# Patient Record
Sex: FEMALE | Race: WHITE | Employment: OTHER | URBAN - METROPOLITAN AREA
[De-identification: names, ages, dates, MRNs, and addresses within clinical notes are randomized per-mention and may not be internally consistent; named-entity substitution may affect disease eponyms.]

---

## 2020-11-09 ENCOUNTER — APPOINTMENT (OUTPATIENT)
Dept: GENERAL RADIOLOGY | Age: 75
End: 2020-11-09
Attending: ORTHOPAEDIC SURGERY
Payer: COMMERCIAL

## 2020-11-09 ENCOUNTER — HOSPITAL ENCOUNTER (OUTPATIENT)
Age: 75
Setting detail: OUTPATIENT SURGERY
Discharge: HOME | End: 2020-11-09
Attending: ORTHOPAEDIC SURGERY | Admitting: ORTHOPAEDIC SURGERY
Payer: COMMERCIAL

## 2020-11-09 ENCOUNTER — ANESTHESIA (OUTPATIENT)
Dept: SURGERY | Age: 75
End: 2020-11-09
Payer: COMMERCIAL

## 2020-11-09 ENCOUNTER — ANESTHESIA EVENT (OUTPATIENT)
Dept: SURGERY | Age: 75
End: 2020-11-09
Payer: COMMERCIAL

## 2020-11-09 VITALS
TEMPERATURE: 97.6 F | DIASTOLIC BLOOD PRESSURE: 70 MMHG | HEART RATE: 67 BPM | RESPIRATION RATE: 16 BRPM | WEIGHT: 170 LBS | SYSTOLIC BLOOD PRESSURE: 141 MMHG | BODY MASS INDEX: 26.63 KG/M2 | OXYGEN SATURATION: 96 %

## 2020-11-09 LAB — POTASSIUM BLD-SCNC: 4 MMOL/L (ref 3.5–5.1)

## 2020-11-09 PROCEDURE — 74011000250 HC RX REV CODE- 250: Performed by: NURSE ANESTHETIST, CERTIFIED REGISTERED

## 2020-11-09 PROCEDURE — 76060000033 HC ANESTHESIA 1 TO 1.5 HR: Performed by: ORTHOPAEDIC SURGERY

## 2020-11-09 PROCEDURE — 76210000006 HC OR PH I REC 0.5 TO 1 HR: Performed by: ORTHOPAEDIC SURGERY

## 2020-11-09 PROCEDURE — C1713 ANCHOR/SCREW BN/BN,TIS/BN: HCPCS | Performed by: ORTHOPAEDIC SURGERY

## 2020-11-09 PROCEDURE — 73610 X-RAY EXAM OF ANKLE: CPT

## 2020-11-09 PROCEDURE — 74011250636 HC RX REV CODE- 250/636: Performed by: NURSE ANESTHETIST, CERTIFIED REGISTERED

## 2020-11-09 PROCEDURE — 77030000032 HC CUF TRNQT ZIMM -B: Performed by: ORTHOPAEDIC SURGERY

## 2020-11-09 PROCEDURE — 76010010054 HC POST OP PAIN BLOCK: Performed by: ORTHOPAEDIC SURGERY

## 2020-11-09 PROCEDURE — 74011250636 HC RX REV CODE- 250/636: Performed by: ANESTHESIOLOGY

## 2020-11-09 PROCEDURE — 77030020782 HC GWN BAIR PAWS FLX 3M -B: Performed by: ANESTHESIOLOGY

## 2020-11-09 PROCEDURE — 74011250637 HC RX REV CODE- 250/637: Performed by: ANESTHESIOLOGY

## 2020-11-09 PROCEDURE — 2709999900 HC NON-CHARGEABLE SUPPLY: Performed by: ORTHOPAEDIC SURGERY

## 2020-11-09 PROCEDURE — 77030019908 HC STETH ESOPH SIMS -A: Performed by: ANESTHESIOLOGY

## 2020-11-09 PROCEDURE — 77030003602 HC NDL NRV BLK BBMI -B: Performed by: ANESTHESIOLOGY

## 2020-11-09 PROCEDURE — 77030040922 HC BLNKT HYPOTHRM STRY -A: Performed by: ANESTHESIOLOGY

## 2020-11-09 PROCEDURE — 74011250636 HC RX REV CODE- 250/636: Performed by: ORTHOPAEDIC SURGERY

## 2020-11-09 PROCEDURE — 74011000250 HC RX REV CODE- 250: Performed by: ANESTHESIOLOGY

## 2020-11-09 PROCEDURE — 76942 ECHO GUIDE FOR BIOPSY: CPT | Performed by: ORTHOPAEDIC SURGERY

## 2020-11-09 PROCEDURE — 77030029732 HC BIT DRL ORTHOLOC 3DI WRGH -B: Performed by: ORTHOPAEDIC SURGERY

## 2020-11-09 PROCEDURE — 77030002933 HC SUT MCRYL J&J -A: Performed by: ORTHOPAEDIC SURGERY

## 2020-11-09 PROCEDURE — 77030021122 HC SPLNT MAT FST BSNM -A: Performed by: ORTHOPAEDIC SURGERY

## 2020-11-09 PROCEDURE — 74011000250 HC RX REV CODE- 250: Performed by: ORTHOPAEDIC SURGERY

## 2020-11-09 PROCEDURE — 76010000161 HC OR TIME 1 TO 1.5 HR INTENSV-TIER 1: Performed by: ORTHOPAEDIC SURGERY

## 2020-11-09 PROCEDURE — 76210000021 HC REC RM PH II 0.5 TO 1 HR: Performed by: ORTHOPAEDIC SURGERY

## 2020-11-09 PROCEDURE — 84132 ASSAY OF SERUM POTASSIUM: CPT

## 2020-11-09 PROCEDURE — 77030029902 HC PIN TEMP FIX LG WRGH -B: Performed by: ORTHOPAEDIC SURGERY

## 2020-11-09 DEVICE — IMPLANTABLE DEVICE
Type: IMPLANTABLE DEVICE | Site: ANKLE | Status: FUNCTIONAL
Brand: ORTHOLOC 3DI

## 2020-11-09 DEVICE — IMPLANTABLE DEVICE
Type: IMPLANTABLE DEVICE | Site: ANKLE | Status: FUNCTIONAL
Brand: ORTHOLOC

## 2020-11-09 DEVICE — IMPLANTABLE DEVICE
Type: IMPLANTABLE DEVICE | Site: ANKLE | Status: FUNCTIONAL
Brand: ORTHOLOC 3DI PLATING SYSTEM

## 2020-11-09 RX ORDER — LIDOCAINE HYDROCHLORIDE 10 MG/ML
0.1 INJECTION INFILTRATION; PERINEURAL AS NEEDED
Status: DISCONTINUED | OUTPATIENT
Start: 2020-11-09 | End: 2020-11-09 | Stop reason: HOSPADM

## 2020-11-09 RX ORDER — ONDANSETRON 2 MG/ML
4 INJECTION INTRAMUSCULAR; INTRAVENOUS
Status: DISCONTINUED | OUTPATIENT
Start: 2020-11-09 | End: 2020-11-09 | Stop reason: HOSPADM

## 2020-11-09 RX ORDER — ACETAMINOPHEN 500 MG
1000 TABLET ORAL ONCE
Status: COMPLETED | OUTPATIENT
Start: 2020-11-09 | End: 2020-11-09

## 2020-11-09 RX ORDER — SODIUM CHLORIDE, SODIUM LACTATE, POTASSIUM CHLORIDE, CALCIUM CHLORIDE 600; 310; 30; 20 MG/100ML; MG/100ML; MG/100ML; MG/100ML
1000 INJECTION, SOLUTION INTRAVENOUS CONTINUOUS
Status: DISCONTINUED | OUTPATIENT
Start: 2020-11-09 | End: 2020-11-09 | Stop reason: HOSPADM

## 2020-11-09 RX ORDER — DEXAMETHASONE SODIUM PHOSPHATE 4 MG/ML
INJECTION, SOLUTION INTRA-ARTICULAR; INTRALESIONAL; INTRAMUSCULAR; INTRAVENOUS; SOFT TISSUE AS NEEDED
Status: DISCONTINUED | OUTPATIENT
Start: 2020-11-09 | End: 2020-11-09 | Stop reason: HOSPADM

## 2020-11-09 RX ORDER — LIDOCAINE HYDROCHLORIDE 20 MG/ML
INJECTION, SOLUTION EPIDURAL; INFILTRATION; INTRACAUDAL; PERINEURAL AS NEEDED
Status: DISCONTINUED | OUTPATIENT
Start: 2020-11-09 | End: 2020-11-09 | Stop reason: HOSPADM

## 2020-11-09 RX ORDER — FENTANYL CITRATE 50 UG/ML
INJECTION, SOLUTION INTRAMUSCULAR; INTRAVENOUS AS NEEDED
Status: DISCONTINUED | OUTPATIENT
Start: 2020-11-09 | End: 2020-11-09 | Stop reason: HOSPADM

## 2020-11-09 RX ORDER — OXYCODONE HYDROCHLORIDE 5 MG/1
5 TABLET ORAL
Status: DISCONTINUED | OUTPATIENT
Start: 2020-11-09 | End: 2020-11-09 | Stop reason: HOSPADM

## 2020-11-09 RX ORDER — CEFAZOLIN SODIUM/WATER 2 G/20 ML
2 SYRINGE (ML) INTRAVENOUS ONCE
Status: COMPLETED | OUTPATIENT
Start: 2020-11-09 | End: 2020-11-09

## 2020-11-09 RX ORDER — PROPOFOL 10 MG/ML
INJECTION, EMULSION INTRAVENOUS AS NEEDED
Status: DISCONTINUED | OUTPATIENT
Start: 2020-11-09 | End: 2020-11-09 | Stop reason: HOSPADM

## 2020-11-09 RX ORDER — BUPIVACAINE HYDROCHLORIDE 5 MG/ML
INJECTION, SOLUTION EPIDURAL; INTRACAUDAL
Status: COMPLETED | OUTPATIENT
Start: 2020-11-09 | End: 2020-11-09

## 2020-11-09 RX ORDER — PROPOFOL 10 MG/ML
INJECTION, EMULSION INTRAVENOUS
Status: DISCONTINUED | OUTPATIENT
Start: 2020-11-09 | End: 2020-11-09 | Stop reason: HOSPADM

## 2020-11-09 RX ORDER — SODIUM CHLORIDE 0.9 % (FLUSH) 0.9 %
5-40 SYRINGE (ML) INJECTION AS NEEDED
Status: DISCONTINUED | OUTPATIENT
Start: 2020-11-09 | End: 2020-11-09 | Stop reason: HOSPADM

## 2020-11-09 RX ORDER — BUPIVACAINE HYDROCHLORIDE 5 MG/ML
INJECTION, SOLUTION EPIDURAL; INTRACAUDAL AS NEEDED
Status: DISCONTINUED | OUTPATIENT
Start: 2020-11-09 | End: 2020-11-09 | Stop reason: HOSPADM

## 2020-11-09 RX ORDER — EPHEDRINE SULFATE/0.9% NACL/PF 50 MG/5 ML
SYRINGE (ML) INTRAVENOUS AS NEEDED
Status: DISCONTINUED | OUTPATIENT
Start: 2020-11-09 | End: 2020-11-09 | Stop reason: HOSPADM

## 2020-11-09 RX ORDER — ALBUTEROL SULFATE 0.83 MG/ML
2.5 SOLUTION RESPIRATORY (INHALATION) AS NEEDED
Status: DISCONTINUED | OUTPATIENT
Start: 2020-11-09 | End: 2020-11-09 | Stop reason: HOSPADM

## 2020-11-09 RX ORDER — HYDROMORPHONE HYDROCHLORIDE 2 MG/ML
0.5 INJECTION, SOLUTION INTRAMUSCULAR; INTRAVENOUS; SUBCUTANEOUS
Status: DISCONTINUED | OUTPATIENT
Start: 2020-11-09 | End: 2020-11-09 | Stop reason: HOSPADM

## 2020-11-09 RX ORDER — MIDAZOLAM HYDROCHLORIDE 1 MG/ML
2 INJECTION, SOLUTION INTRAMUSCULAR; INTRAVENOUS
Status: COMPLETED | OUTPATIENT
Start: 2020-11-09 | End: 2020-11-09

## 2020-11-09 RX ORDER — ONDANSETRON 2 MG/ML
INJECTION INTRAMUSCULAR; INTRAVENOUS AS NEEDED
Status: DISCONTINUED | OUTPATIENT
Start: 2020-11-09 | End: 2020-11-09 | Stop reason: HOSPADM

## 2020-11-09 RX ORDER — SODIUM CHLORIDE 0.9 % (FLUSH) 0.9 %
5-40 SYRINGE (ML) INJECTION EVERY 8 HOURS
Status: DISCONTINUED | OUTPATIENT
Start: 2020-11-09 | End: 2020-11-09 | Stop reason: HOSPADM

## 2020-11-09 RX ADMIN — PROPOFOL 20 MG: 10 INJECTION, EMULSION INTRAVENOUS at 13:01

## 2020-11-09 RX ADMIN — FENTANYL CITRATE 25 MCG: 50 INJECTION INTRAMUSCULAR; INTRAVENOUS at 12:16

## 2020-11-09 RX ADMIN — ONDANSETRON 4 MG: 2 INJECTION INTRAMUSCULAR; INTRAVENOUS at 12:49

## 2020-11-09 RX ADMIN — FENTANYL CITRATE 25 MCG: 50 INJECTION INTRAMUSCULAR; INTRAVENOUS at 13:01

## 2020-11-09 RX ADMIN — ACETAMINOPHEN 1000 MG: 500 TABLET, FILM COATED ORAL at 11:00

## 2020-11-09 RX ADMIN — MIDAZOLAM 2 MG: 1 INJECTION INTRAMUSCULAR; INTRAVENOUS at 11:07

## 2020-11-09 RX ADMIN — FENTANYL CITRATE 25 MCG: 50 INJECTION INTRAMUSCULAR; INTRAVENOUS at 12:21

## 2020-11-09 RX ADMIN — SODIUM CHLORIDE, SODIUM LACTATE, POTASSIUM CHLORIDE, AND CALCIUM CHLORIDE: 600; 310; 30; 20 INJECTION, SOLUTION INTRAVENOUS at 12:02

## 2020-11-09 RX ADMIN — FENTANYL CITRATE 25 MCG: 50 INJECTION INTRAMUSCULAR; INTRAVENOUS at 12:51

## 2020-11-09 RX ADMIN — Medication 2 G: at 12:13

## 2020-11-09 RX ADMIN — PROPOFOL 30 MG: 10 INJECTION, EMULSION INTRAVENOUS at 12:13

## 2020-11-09 RX ADMIN — BUPIVACAINE HYDROCHLORIDE 25 ML: 5 INJECTION, SOLUTION EPIDURAL; INTRACAUDAL; PERINEURAL at 11:15

## 2020-11-09 RX ADMIN — DEXAMETHASONE SODIUM PHOSPHATE 4 MG: 4 INJECTION, SOLUTION INTRAMUSCULAR; INTRAVENOUS at 12:49

## 2020-11-09 RX ADMIN — LIDOCAINE HYDROCHLORIDE 20 MG: 20 INJECTION, SOLUTION EPIDURAL; INFILTRATION; INTRACAUDAL; PERINEURAL at 12:13

## 2020-11-09 RX ADMIN — PROPOFOL 140 MCG/KG/MIN: 10 INJECTION, EMULSION INTRAVENOUS at 12:16

## 2020-11-09 RX ADMIN — Medication 15 MG: at 12:33

## 2020-11-09 NOTE — ANESTHESIA POSTPROCEDURE EVALUATION
Procedure(s):  RIGHT ANKLE OPEN REDUCTION INTERNAL FIXATION. total IV anesthesia    Anesthesia Post Evaluation      Multimodal analgesia: multimodal analgesia used between 6 hours prior to anesthesia start to PACU discharge  Patient location during evaluation: bedside  Patient participation: complete - patient participated  Level of consciousness: awake and responsive to light touch  Pain management: adequate  Airway patency: patent  Anesthetic complications: no  Cardiovascular status: acceptable, hemodynamically stable, blood pressure returned to baseline and stable  Respiratory status: acceptable, unassisted, spontaneous ventilation and nonlabored ventilation  Hydration status: acceptable  Post anesthesia nausea and vomiting:  controlled      INITIAL Post-op Vital signs:   Vitals Value Taken Time   /65 11/9/2020  1:33 PM   Temp     Pulse 57 11/9/2020  1:34 PM   Resp     SpO2 98 % 11/9/2020  1:34 PM   Vitals shown include unvalidated device data.

## 2020-11-09 NOTE — ANESTHESIA PROCEDURE NOTES
Peripheral Block    Start time: 11/9/2020 11:07 AM  End time: 11/9/2020 11:15 AM  Performed by: Anne North MD  Authorized by: Anne North MD       Pre-procedure:    Indications: at surgeon's request and post-op pain management    Preanesthetic Checklist: patient identified, risks and benefits discussed, site marked, timeout performed, anesthesia consent given and patient being monitored    Timeout Time: 11:07          Block Type:   Block Type:  Popliteal  Laterality:  Right  Monitoring:  Continuous pulse ox, frequent vital sign checks, heart rate, oxygen and responsive to questions  Injection Technique:  Single shot  Procedures: ultrasound guided    Prep: chlorhexidine    Location:  Lower thigh  Needle Type:  Stimuplex  Needle Gauge:  20 G  Needle Localization:  Ultrasound guidance  Medication Injected:  Bupivacaine (PF) (MARCAINE) 0.5% injection, 25 mL  Med Admin Time: 11/9/2020 11:15 AM    Assessment:  Number of attempts:  1  Injection Assessment:  Incremental injection every 5 mL, local visualized surrounding nerve on ultrasound, negative aspiration for blood, no intravascular symptoms, no paresthesia and ultrasound image on chart  Patient tolerance:  Patient tolerated the procedure well with no immediate complications

## 2020-11-09 NOTE — ANESTHESIA PREPROCEDURE EVALUATION
Relevant Problems   No relevant active problems       Anesthetic History   No history of anesthetic complications            Review of Systems / Medical History  Patient summary reviewed and pertinent labs reviewed    Pulmonary  Within defined limits                 Neuro/Psych         Psychiatric history     Cardiovascular    Hypertension              Exercise tolerance: <4 METS     GI/Hepatic/Renal  Within defined limits              Endo/Other      Hypothyroidism       Other Findings              Physical Exam    Airway  Mallampati: II  TM Distance: 4 - 6 cm  Neck ROM: normal range of motion   Mouth opening: Normal     Cardiovascular    Rhythm: regular  Rate: normal      Pertinent negatives: No murmur   Dental  No notable dental hx       Pulmonary  Breath sounds clear to auscultation               Abdominal         Other Findings            Anesthetic Plan    ASA: 2  Anesthesia type: total IV anesthesia      Post-op pain plan if not by surgeon: peripheral nerve block single    Induction: Intravenous  Anesthetic plan and risks discussed with: Patient

## 2020-11-09 NOTE — INTERVAL H&P NOTE
Update History & Physical 
 
The Patient's History and Physical was reviewed I discussed the surgery and patients medical condition with the patient. The chart was reviewed with the patient and I examined the patient. There was no change. The surgical site was confirmed by the patient and me. CV: RRR 
RESP: CTAB Plan:  The risk, benefits, expected outcome, and alternative to the recommended procedure have been discussed with the patient. Patient understands and wants to proceed with the procedure.  
 
Electronically signed by Darío Wagner MD on 10/19/2020 at 10:10 AM

## 2020-11-09 NOTE — H&P
History and Physical    Summary: Right displaced fibular fracture. Operative fixation next week. Open reduction internal fixation right distal fibula with [facility].  Needs preoperative block and MAC    CC: Right Ankle pain    HPI: The patient presents today with pain, it began 25th. They locate to right lateral ankle. They rate as and/10. They describe pain as throbbing. It is aggravated by walking and pressure. It is alleviated by rest.  She originally went to an outside physician who diagnosed her with a distal for the fracture and gave her a Cam walker boot. She has been able to walk in his boot to causes extreme pain. She is very tender and states she cannot sleep anymore. ROS:  Standardized patient intake form was reviewed and signed by me. It covers 10 organ systems. [address]:  Past Medical, Family and Social history was obtained by standardized patient intake form. It was reviewed and signed by me  Allergies:????? Medications:Aspirin (325 MG, Take 1 tablet(s) by mouth once a day for 30 days); Colace (100 MG, Take 1 tablet(s) by mouth 2 times a day for 14 days); Norco (5-325 MG, Take 1 tablet(s) by mouth every 6 hours as needed [PRN]);Zofran (4 MG, Take 1 tablet(s) by mouth every 8 hours as needed [PRN])    Tobacco: None  Diabetes: None    PE:  WDWN  patient in NAD, AAOx3, Appropriate mood and affect. . Patient ambulates with difficulty due to ankle pain. Uses assistive devices. Pulse:80     BUE: show  normal full motion of bilateral wrist, no thenar atrophy, normal  strength, hands are non TTP, with no skin lesions. No pathologic reflexes noted: negative Ramesh, negative inverted radial reflex. Negative Wartenberg sign   BLE: show no lymphadenopathy, normal monofilament, patellar tendon reflexes 3+. Left LE -- this is the unaffected side: FROM actively of toes, foot, ankle, knee and hip. No instability of foot or ankle with drawer and stress.   5/5 strength to TA/EHL/GSC/Peroneals/PTib. No skin lesions, Non TTP throughout. There is no edema or ecchymosis. Right LE -- this is the affected side: Ecchymoses and edema noted throughout the foot and into the toe. Various stages of bruising with yellow blues Black and reds. Sensation intact to s/s/spn/dpn/t EHL, FHL intact. 2+ dorsalis pedis pulse. Palpation of the lateral malleolus. Decreased range of motion of the ankle secondary to pain and edema. XR Interpretation: As of the right ankle standing reveal a shortened displaced and rotated distal fibular fracture. Medial malleolus shows no signs of fractures in the ankle mortise is actually maintained. Assessment/Diagnosis: Right Displaced distal fibula fracture    Plan:   1- Weight Bearing -non-weightbearing and walking boot  2 - XR needed at Next appt: -None  3-  Immobilization/Orthotics -walking boot Patient declined our CAM boot, kept her original one. 4- Medication -preoperative medications given. I personally had a discussion about abuse potential of narcotics, how narcotics are only given for acute injuries or operative pain, and how no refills can nor will be given until the patients next appointment. They expressed understanding. 5-I had a long discussion with her about operative versus nonoperative fixation. I told her that nonoperative treatment could be performed as the ankle mortises are mostly stable. She has been walking on it and is proven instability. However the displacement of the fracture, the rotation and the shortening I think will cause long-term dysfunction and pain. After discussion of the nonoperative treatment and the operative treatment she states she wishes to have it fixed. We will pursue open reduction internal fixation of the distal fibula. This will be done with a right medical plate. I explained the risk of surgery to be infection, malunion, nonunion, hardware irritation, nerve damage, wound healing problems.   She understands all this and wishes to have it fixed. We will proceed with surgery.

## 2020-11-09 NOTE — PROGRESS NOTES
Discharge complete. Discharge instructions reviewed with pt and friend. Pt given info for knee scooter rental. Pt and friend asked multiple questions, all of which were reinforced. Time for questions allowed. Pt denies any questions at this time. Dressing to right ankle noted to be clean, dry, and intact. Pt assisted to front of hospital via wheelchair.      Visit Vitals  BP (!) 141/70   Pulse 67   Temp 97.6 °F (36.4 °C)   Resp 16   Wt 77.1 kg (170 lb)   SpO2 96%   BMI 26.63 kg/m²

## 2020-11-09 NOTE — DISCHARGE INSTRUCTIONS
INSTRUCTIONS FOLLOWING FOOT SURGERY    ACTIVITY  Elevate foot (feet) for 48 hours. Use crutches as directed by your doctor. No weight bearing on operative foot. Take one full strength aspirin (325 mg) per day, unless you have been told by your primary MD not to or have history of GI ulcers. Get up out of bed frequently. While in the bed, move your legs as much as possible. DIET  Clear liquids until no nausea or vomiting; then light diet for the first day. Advance to regular diet on second day, unless your doctor orders otherwise. PAIN  Take pain medications as directed by your doctor. Call your doctor if pain is NOT relieved by medication. DO NOT take aspirin or blood thinners until directed by your doctor. DRESSING CARE  Keep clean and dry until follow up appointment. CALL YOUR DOCTOR IF YOU HAVE  Excessive bleeding that does not stop after holding mild pressure over the area. Temperature of 101 degrees or above. Loss of sensation - cold, white or blue toes. After general anesthesia or intravenous sedation, for 24 hours or while taking prescription Narcotics:  · Limit your activities  · A responsible adult needs to be with you for the next 24 hours  · Do not drive and operate hazardous machinery  · Do not make important personal or business decisions  · Do not drink alcoholic beverages  · If you have not urinated within 8 hours after discharge, and you are experiencing discomfort from urinary retention, please go to the nearest ED. · If you have sleep apnea and have a CPAP machine, please use it for all naps and sleeping. · Please use caution when taking narcotics and any of your home medications that may cause drowsiness. *  Please give a list of your current medications to your Primary Care Provider. *  Please update this list whenever your medications are discontinued, doses are      changed, or new medications (including over-the-counter products) are added.   *  Please carry medication information at all times in case of emergency situations. These are general instructions for a healthy lifestyle:  No smoking/ No tobacco products/ Avoid exposure to second hand smoke  Surgeon General's Warning:  Quitting smoking now greatly reduces serious risk to your health. Obesity, smoking, and sedentary lifestyle greatly increases your risk for illness  A healthy diet, regular physical exercise & weight monitoring are important for maintaining a healthy lifestyle    You may be retaining fluid if you have a history of heart failure or if you experience any of the following symptoms:  Weight gain of 3 pounds or more overnight or 5 pounds in a week, increased swelling in our hands or feet or shortness of breath while lying flat in bed. Please call your doctor as soon as you notice any of these symptoms; do not wait until your next office visit.

## 2020-11-09 NOTE — OP NOTES
Operative Note    Patient:Carrie Louis  MRN: [id number]    Date Of Surgery: 11/9/2020    Surgeon: Rhonda Jessica MD    Assistant Surgeon: None    Procedure Performed:  right  ORIF Lateral Malleolus CPT 82737  ORIF Syndesmosis CPT 86795    Pre Op Diagnosis:  Right distal fibula fracture    Post Op Diagnosis:   same    Implants:   Implant Name Type Inv.  Item Serial No.  Lot No. LRB No. Used Action   PLATE BNE O64RY R LAT FIB FOR ANK FRAC SYS ORTHOLOC 3DI - [id number]  PLATE BNE G04XI R LAT FIB FOR ANK FRAC SYS ORTHOLOC 3DI  [employer] [id number] Right 1 Implanted   SCREW BNE L10MM DIA3.5MM SAUL TI POLYAX WENDIE FULL THRD FOR - [id number]  SCREW BNE L10MM DIA3.5MM SAUL TI POLYAX WENDIE FULL THRD FOR  [employer] [id number] Right 1 Implanted   SCREW BNE L12MM DIA3.5MM SAUL TI POLYAX WENDIE FULL THRD FOR - [id number]  SCREW BNE L12MM DIA3.5MM SAUL TI POLYAX WENDIE FULL THRD FOR  [employer] [id number] Right 1 Implanted   SCR BNE LCK PLT 3DIA 3.5X16MM -- ORTHOLOC HALLUX - [id number]  SCR BNE LCK PLT 3DIA 3.5X16MM -- [facility] [id number] Right 1 Implanted   SCR BNE LCK PLT 3DIA 3.5X14MM -- ORTHOLOC HALLUX - [id number]  SCR BNE LCK PLT 3DIA 3.5X14MM -- [facility] [id number] Right 1 Implanted   SCREW BNE L12MM DIA3.5MM SAUL TI POLYAX NONLOCKING FULL - [id number]  SCREW BNE L12MM DIA3.5MM SAUL TI POLYAX NONLOCKING FULL  [employer] [id number] Right 2 Implanted   SCREW BNE L14MM DIA3.5MM SAUL FT ANK TI NONLOCKING FULL - [id number]  SCREW BNE L14MM DIA3.5MM SAUL FT ANK TI NONLOCKING FULL  [employer] [id number] Right 1 Implanted   SCREW BNE L50MM DIA3.5MM SAUL TI FOREFOOT MIDFOOT ANK - [id number]  SCREW BNE L50MM DIA3.5MM SAUL TI FOREFOOT MIDFOOT ANK  [employer] [facility]_ [id number] Right 1 Implanted   SCREW BNE L60MM DIA3.5MM SAUL TI FOREFOOT MIDFOOT ANK - [id number]  SCREW BNE L60MM DIA3.5MM SAUL TI FOREFOOT MIDFOOT ANK  [employer] [id number] Right 1 Implanted       Anesthesia:  MAC    Blood Loss:  10cc    Tourniquet:  Estimated 45 minutes    Complications:  None. The bone quality was very poor so extra fixation was needed    Pre Operative Abx:   Ancef 2g    Specimens/Cultures:  none    Significant Findings:  Bone quality the distal fibula was very poor. Attempting to use a point reduction clamp on the bone created giant holes. Due to this a bridge technique with Quad cortical syndesmotic fixation was performed. Pre Operative Course:  Alexis Kraus is a 67 y.o. female who 2 weeks ago sustained a distal fibula fracture. She has been walking on it but still has intense pain. X-rays in the office showed significant displacement and shortening of the fibula. Had a long discussion with her about operative versus nonoperative fixation and she chose operative fixation to increase her postoperative outcomes. Operation In Detail:  Patient was evaluated in the preoperative area. The right lower extremity was marked by me. We had a long discussion about the procedure and postoperative protocols. The patient was then brought back to the operating room suite and placed in the operating room table. A timeout was taken to identify the patient, procedure being performed, and laterality. After this the patient was prepped and draped in the normal sterile fashion using a Betadine solution and/or a ChloraPrep solution. A timeout was then taken to identify the patient his name, date of birth, laterality, and procedure being performed. We also identified allergies and any concerns about the operation. Attention was then placed to the operative extremity. A lateral incision over the fibula was made with a 15 blade knife.  The skin was incised, the superficial perineal nerve protected, and this dissection was taken down to the fracture site. The incision was carried distally down to the tip of the fibula. The peroneal tendons behind the fibula were protected. Then using a knife, rongeur, and curette we debrided the fracture site of any soft tissues and bone fragments. After adequate debridement of the fracture, we irrigated the fracture. Using reduction clamps and K wires the fracture was reduced. X Rays and direct visualization was used to insure fibular length, alignment and rotation. The clamps were digging into her bone too much. The bone quality was too poor to continue with the lag screw plan. Due to this we performed a bridge plate construct along with syndesmosis fixation     Using a bridge plate construct, we picked the appropriate length plate and placed it on the fibula. Once the plate was centered on the fibula and the reduction again confirmed, we placed 3 screws above the fracture and filled in the distal locking screws below the fracture. All clamps and k wires were removed. The fracture remained reduced. Manipulating the ankle revealed some motion at the fracture site still and I felt that this was too much motion. This, combined with her age and poor bone quality, made me want to reinforce this construct with screws across the syndesmosis. Attention was given to the syndesmosis and its fixation. I dissected anteriorly to visualize the syndesmosis. I then placed 2 screws across the syndesmosis;  1 proximally to the plate and 1 distally through the plate. This was done under XR to confirm good placement and good screw lengths. I drilled, measured, and inserted the appropriate length screw. He required cortical screws from the fibula into the tibia. A sterile dressing was then applied to the leg and Posterior slab splint with Stirrups. They were awoken from anesthesia and returned to the PACU without difficulty.     Post Operative Plan:   1- WB status: NWB Right Lower Extremity  2- Follow Up: 2-3 weeks  3- Immobilization/assistive devices: brace/splint  4- DVT px:  mg BID  5- Pain Medication: Percocet 5mg/325 q6 PRN pain #30, colace 100mg po BID x 14days and zofran 4mg po q8 PRN nausea #20  6- NWB x 2 weeks. Follow up for stitch removal and wound check.

## 2021-01-05 ENCOUNTER — HOSPITAL ENCOUNTER (OUTPATIENT)
Dept: PHYSICAL THERAPY | Age: 76
Discharge: HOME | End: 2021-01-05
Payer: COMMERCIAL

## 2021-01-05 PROCEDURE — 97161 PT EVAL LOW COMPLEX 20 MIN: CPT

## 2021-01-05 PROCEDURE — 97110 THERAPEUTIC EXERCISES: CPT

## 2021-01-05 NOTE — THERAPY EVALUATION
Shiv Bhakta : 1945 [address] @ [place] [address]. [phone]   [phone] OUTPATIENT PHYSICAL THERAPY:Initial Assessment 2021 ICD-10: Treatment Diagnosis: Pain in left leg (M79.605), Pain in right ankle and joints of right foot (M25.571) and Stiffness of right ankle, not elsewhere classified (M25.671) Precautions/Allergies:  
Latex, Fenofibrate, Fluoxetine, Phenazopyridine, and Sulfa (sulfonamide antibiotics) Fall Risk Score:   
 Ambulatory/Rehab Services H2 Model Falls Risk Assessment Risk Factors: 
     No Risk Factors Identified Ability to Rise from Chair: 
     (0)  Ability to rise in a single movement Falls Prevention Plan: No modifications necessary Total: (5 or greater = High Risk): 0  
  [facility] States Patent #[id number]. Federal Law prohibits the replication, distribution or use without written permission from [facility] [address] [place] [address] MD Orders: evaluate and treat MEDICAL/REFERRING DIAGNOSIS: 
Displaced fracture of lateral malleolus of right fibula, initial encounter for closed fracture [S82.61XA] DATE OF ONSET: date of surgery: 20 s/p ORIF of lateral malleolus REFERRING PHYSICIAN: Adryan Menendez MD 
RETURN PHYSICIAN APPOINTMENT: 21 INITIAL ASSESSMENT:  Ms. Gennaro Brandon presents to therapy following above surgical procedure. She has limited ankle mobility due to immobilization, stiffness of the talocrural joint, and tightness of the calf musculature. She has weakness through the lower leg due to decreased weight bearing and continued healing of the surgical repair. She has impaired proprioception with decreased dynamic stability due to weakness of the foot ankle musculature. Impairments cause difficulty with all mobility tasks. Skilled therapy is required to return to prior level of function. PROBLEM LIST (Impacting functional limitations): 1. Decreased Strength 2. Decreased ADL/Functional Activities 3. Decreased Ambulation Ability/Technique 4. Decreased Balance 5. Increased Pain 6. Decreased Activity Tolerance 7. Decreased Flexibility/Joint Mobility INTERVENTIONS PLANNED: 
1. Balance Exercise 2. Family Education 3. Gait Training 4. Home Exercise Program (HEP) 5. Manual Therapy 6. Neuromuscular Re-education/Strengthening 7. Range of Motion (ROM) 8. Therapeutic Activites 9. Therapeutic Exercise/Strengthening 10. modalities TREATMENT PLAN: 
Effective Dates: 1/5/2021 TO 4/6/2021 (90 days). Frequency/Duration: 3 times a week for 90 Days GOALS: (Goals have been discussed and agreed upon with patient.) Short-Term Functional Goals: Time Frame: 2 weeks 1. Patient will be independent with HEP. 2. Patient will improve ankle DF to 8 ° to improve mobility for symmetric gait on level surfaces. Discharge Goals: Time Frame: 4 weeks 1. Patient will be able to perform single leg stance for >10 seconds to normalize proprioception for safe mobility on all surfaces. 2. Patient will improve ankle DF to 10 ° to normalize ankle mobility for tasks such as stair descent. 3. Patient will improve ankle PF strength to 4+/5 to normalize propulsion during gait for increased gait speed. Rehabilitation Potential For Stated Goals: Excellent Regarding Myron Owen's therapy, I certify that the treatment plan above will be carried out by a therapist or under their direction. Thank you for this referral, Shamar Bui DPT Referring Physician Signature: Yandy Fox MD              Date HISTORY:  
History of Present Injury/Illness (Reason for Referral): 
 Patient presents to therapy following above surgical repair. Initial injury occurred approximately 2 weeks prior to surgery when she slipped going down stairs and her foot got caught in a railing. She notes no complications from surgery and was NWB for 4 weeks. She progressed to a walking boot and has been using a lace up brace for approximately 2 weeks. She reports symptoms of pulling through the thigh and ankle musculature which she attributes to muscle tightness. She also notes intermittent burning at the ankle along the incision which she associates with continued healing. She also describes soreness at the anterior ankle. She has been trying to do more walking and standing activities as she lives alone. Past Medical History/Comorbidities: Ms. Gennaro Brandon  has a past medical history of Endocrine disease, Hypertension, Other ill-defined conditions(799.89), Psychiatric disorder, and Thyroid disease. She also has no past medical history of Difficult intubation, Malignant hyperthermia due to anesthesia, Nausea & vomiting, or Pseudocholinesterase deficiency. Ms. Gennaro Brandon  has a past surgical history that includes hx hysterectomy; hx cholecystectomy; and hx thyroidectomy. Social History/Living Environment:  
  Patient lives alone. Prior Level of Function/Work/Activity: 
Patient is retired. She was previously fully independent. Dominant Side:  
      RIGHT Current Medications:   
Current Outpatient Medications:  
  amLODIPine (NORVASC) 10 mg tablet, Take 5-10 mg by mouth daily (after lunch). Amlodipine 10 mg greater than 140/80 Amlodipine 5 mg for BP less than 140/80, Disp: , Rfl:  
  hydroCHLOROthiazide (HYDRODIURIL) 25 mg tablet, Take 25 mg by mouth daily. , Disp: , Rfl:  
  ondansetron hcl (ZOFRAN) 4 mg tablet, Take 4 mg by mouth every eight (8) hours as needed for Nausea or Vomiting., Disp: , Rfl:  
   HYDROcodone-acetaminophen (NORCO) 5-325 mg per tablet, Take 1 Tab by mouth every six (6) hours as needed. , Disp: , Rfl:  
  fluticasone propionate (FLONASE NA), 2 Sprays by Nasal route daily. , Disp: , Rfl:  
  levothyroxine (SYNTHROID) 88 mcg tablet, Take 88 mcg by mouth Daily (before breakfast). , Disp: , Rfl:  
  losartan (COZAAR) 50 mg tablet, Take 50 mg by mouth daily. , Disp: , Rfl:  
  rosuvastatin (CRESTOR) 5 mg tablet, Take 10 mg by mouth nightly., Disp: , Rfl:  
  nebivolol (BYSTOLIC) 5 mg tablet, Take 10 mg by mouth nightly., Disp: , Rfl:   
Date Last Reviewed:  1/5/2021 # of Personal Factors/Comorbidities that affect the Plan of Care: 0: LOW COMPLEXITY EXAMINATION:  
Observation/Orthostatic Postural Assessment:   
Incision is closed and healed. Ambulates with short L step length with decreased ankle rocker present. Girth: Figure 8: 53cm R, 50cm L; Calf: 38cm R, 35.5cm L Palpation:   
Mild tenderness at the anterior talocrural joint. ROM:   
Hip and knee mobility are WNL. She does note some increased soreness with swelling at the medial joint line of the right knee. Ankle DF: 0 ° R; 15 ° L Ankle PF, In, and Ev are WNL Strength: Ankle DF: 4/5 Ankle In: 4/5 Ankle Ev: 4-/5 Ankle PF: 4-/5, able to perform double limb calf raise, but unable to perform single limb calf raise. Special Tests:   
Not applicable Neurological Screen: 
Grossly intact Functional Mobility:  
WNL Balance:   
Maintains single leg stance for 2 seconds with minimal ankle righting reactions. Body Structures Involved: 1. Nerves 2. Bones 3. Joints 4. Muscles 5. Ligaments Body Functions Affected: 1. Sensory/Pain 2. Neuromusculoskeletal 
3. Movement Related Activities and Participation Affected: 1. General Tasks and Demands 2. Mobility 3. Self Care 4. Domestic Life 5. Interpersonal Interactions and Relationships 6. Community, Social and [place] # of elements that affect the Plan of Care: 1-2: LOW COMPLEXITY CLINICAL PRESENTATION:  
Presentation: Stable and uncomplicated: LOW COMPLEXITY CLINICAL DECISION MAKING:  
Tool Used: Lower Extremity Functional Scale (LEFS) Score:  Initial: 33/80 Most Recent: X/80 (Date: -- ) Interpretation of Score: 20 questions each scored on a 5 point scale with 0 representing \"extreme difficulty or unable to perform\" and 4 representing \"no difficulty\". The lower the score, the greater the functional disability. 80/80 represents no disability. Minimal detectable change is 9 points. Medical Necessity:  
· Patient is expected to demonstrate progress in strength, range of motion, balance and coordination ·  to increase independence with community mobility and return to prior level of function · . Reason for Services/Other Comments: 
· Patient has demonstrated an improvement in functional level by independent performance of HEP. · . Use of outcome tool(s) and clinical judgement create a POC that gives a: Clear prediction of patient's progress: LOW COMPLEXITY Total Treatment Duration: PT Patient Time In/Time Out Time In: 0688 Time Out: 1600 Rosemary Steele DPT

## 2021-01-05 NOTE — PROGRESS NOTES
Anahy Moura  : 1945  Primary: [employer] [facility]  Secondary:  [address] @ [address], [place].  [phone]   [phone]      OUTPATIENT PHYSICAL THERAPY: Daily Treatment Note  2021    ICD-10: Treatment Diagnosis: Pain in left leg (M79.605), Pain in right ankle and joints of right foot (M25.571) and Stiffness of right ankle, not elsewhere classified (M25.671)  Effective Dates: 2021 TO 2021 (90 days). Frequency/Duration: 3 times a week for 90 Days  GOALS: (Goals have been discussed and agreed upon with patient.)  Short-Term Functional Goals: Time Frame: 2 weeks  1. Patient will be independent with HEP. 2. Patient will improve ankle DF to 8 ° to improve mobility for symmetric gait on level surfaces. Discharge Goals: Time Frame: 4 weeks  1. Patient will be able to perform single leg stance for >10 seconds to normalize proprioception for safe mobility on all surfaces. 2. Patient will improve ankle DF to 10 ° to normalize ankle mobility for tasks such as stair descent. 3. Patient will improve ankle PF strength to 4+/5 to normalize propulsion during gait for increased gait speed. _________________________________________________________________________  Pre-treatment Symptoms/Complaints:  See initial evaluation. Pain: Initial: 6/10    Date of surgery: 20 s/p R ankle ORIF to distal fibula    MD f/u: 21 Post Session:  6/10   Medications Last Reviewed:  2021  Updated Objective Findings:  Below measures from initial evaluation unless otherwise noted. Observation/Orthostatic Postural Assessment:    Incision is closed and healed. Ambulates with short L step length with decreased ankle rocker present. Girth: Figure 8: 53cm R, 50cm L; Calf: 38cm R, 35.5cm L  Palpation:    Mild tenderness at the anterior talocrural joint. ROM:    Hip and knee mobility are WNL.  She does note some increased soreness with swelling at the medial joint line of the right knee. Ankle DF: 0 ° R; 15 ° L  Ankle PF, In, and Ev are WNL  Strength: Ankle DF: 4/5   Ankle In: 4/5   Ankle Ev: 4-/5   Ankle PF: 4-/5, able to perform double limb calf raise, but unable to perform single limb calf raise. TREATMENT:   THERAPEUTIC ACTIVITY: ( see below for minutes): Therapeutic activities per grid below to improve mobility, strength, balance and coordination. Required minimal visual, verbal, manual and tactile cues to improve independence and safety with daily activities . THERAPEUTIC EXERCISE: (see below for minutes):  Exercises per grid below to improve mobility, strength, balance and coordination. Required minimal verbal and manual cues to promote proper body alignment, promote proper body posture and promote proper body mechanics. Progressed resistance, range, repetitions and complexity of movement as indicated. MANUAL THERAPY: (see below for minutes): Joint mobilization and Soft tissue mobilization was utilized and necessary because of the patient's restricted joint motion, painful spasm, loss of articular motion and restricted motion of soft tissue. MODALITIES: (see below for minutes):      to decrease pain    SELF CARE: (see below for minutes): Procedure(s) (per grid) utilized to improve and/or restore self-care/home management as related to dressing, bathing and grooming. Required minimal verbal cueing to facilitate activities of daily living skills and compensatory activities.      Date: 1/5/21       Modalities:                                Therapeutic Exercise: 25 min        Calf stretch [id number] with RLE forward keeping foot flat       Single leg stance 2 min intermittently with use of counter PRN       Calf raises 3x10                        education Discussed weaning from ASO in safe environments, use of compression socks       Proprioceptive Activities:                                Manual Therapy: Therapeutic Activities:                                  HEP: see 1/5/21 flow sheet for specifics. [employer] Portal  Treatment/Session Summary:    · Response to Treatment:  Patient is independent with performance of above described home program.  · Communication/Consultation:  None today  · Equipment provided today:  None today  · Recommendations/Intent for next treatment session: Next visit will focus on progression of strength and return to prior level of function.     Total Treatment Billable Duration:  45 minutes  PT Patient Time In/Time Out  Time In: 5634  Time Out: 64 Serjio Pavon, DPT    Future Appointments   Date Time Provider Lyndsey Lindsey   1/7/2021 11:45 AM Kacey Ket, DPT [facility]   1/8/2021 11:00 AM Patricia Gaffney, PTA [facility]   1/11/2021 11:00 AM Patricia Gaffney, PTA [facility]   1/13/2021 11:45 AM Kacey Ket, DPT [facility]   1/15/2021 11:00 AM Patricia Gaffney, PTA [facility]   1/18/2021 11:00 AM Patricia Gaffney, PTA [facility]   1/20/2021  2:45 PM Kacey Ket, DPT [facility]   1/22/2021 11:00 AM Patricia Gaffney, PTA [facility]   1/25/2021 11:00 AM Patricia Gaffney, PTA [facility]   1/27/2021  2:45 PM Kacey Ket, DPT [facility]   1/29/2021 11:00 AM Patricia Gaffney, PTA [facility]

## 2021-01-07 ENCOUNTER — HOSPITAL ENCOUNTER (OUTPATIENT)
Dept: PHYSICAL THERAPY | Age: 76
Discharge: HOME | End: 2021-01-07
Payer: COMMERCIAL

## 2021-01-07 PROCEDURE — 97110 THERAPEUTIC EXERCISES: CPT

## 2021-01-07 PROCEDURE — 97016 VASOPNEUMATIC DEVICE THERAPY: CPT

## 2021-01-07 NOTE — PROGRESS NOTES
Krystina Álvarez  : 1945  [facility]  Secondary:  [address] @ [address], [facility] [place].  [phone]   [phone]      OUTPATIENT PHYSICAL THERAPY: Daily Treatment Note  2021    ICD-10: Treatment Diagnosis: Pain in left leg (M79.605), Pain in right ankle and joints of right foot (M25.571) and Stiffness of right ankle, not elsewhere classified (M25.671)  Effective Dates: 2021 TO 2021 (90 days). Frequency/Duration: 3 times a week for 90 Days  GOALS: (Goals have been discussed and agreed upon with patient.)  Short-Term Functional Goals: Time Frame: 2 weeks  1. Patient will be independent with HEP. 2. Patient will improve ankle DF to 8 ° to improve mobility for symmetric gait on level surfaces. Discharge Goals: Time Frame: 4 weeks  1. Patient will be able to perform single leg stance for >10 seconds to normalize proprioception for safe mobility on all surfaces. 2. Patient will improve ankle DF to 10 ° to normalize ankle mobility for tasks such as stair descent. 3. Patient will improve ankle PF strength to 4+/5 to normalize propulsion during gait for increased gait speed. _________________________________________________________________________  Pre-treatment Symptoms/Complaints:  Notes she felt really good earlier this week, but was very sore the following day. Still having difficulty with putting on ASO. Pain: Initial: 6/10    Date of surgery: 20 s/p R ankle ORIF to distal fibula    MD f/u: 21 Post Session:  6/10   Medications Last Reviewed:  2021  Updated Objective Findings:  Below measures from initial evaluation unless otherwise noted. Observation/Orthostatic Postural Assessment:    Incision is closed and healed. Ambulates with short L step length with decreased ankle rocker present.    Girth: Figure 8: 53cm R, 50cm L; Calf: 38cm R, 35.5cm L  Palpation:    Mild tenderness at the anterior talocrural joint. ROM:    Hip and knee mobility are WNL. She does note some increased soreness with swelling at the medial joint line of the right knee. Ankle DF: 0 ° R; 15 ° L  Ankle PF, In, and Ev are WNL  Strength: Ankle DF: 4/5   Ankle In: 4/5   Ankle Ev: 4-/5   Ankle PF: 4-/5, able to perform double limb calf raise, but unable to perform single limb calf raise. TREATMENT:   THERAPEUTIC ACTIVITY: ( see below for minutes): Therapeutic activities per grid below to improve mobility, strength, balance and coordination. Required minimal visual, verbal, manual and tactile cues to improve independence and safety with daily activities . THERAPEUTIC EXERCISE: (see below for minutes):  Exercises per grid below to improve mobility, strength, balance and coordination. Required minimal verbal and manual cues to promote proper body alignment, promote proper body posture and promote proper body mechanics. Progressed resistance, range, repetitions and complexity of movement as indicated. MANUAL THERAPY: (see below for minutes): Joint mobilization and Soft tissue mobilization was utilized and necessary because of the patient's restricted joint motion, painful spasm, loss of articular motion and restricted motion of soft tissue. MODALITIES: (see below for minutes):      to decrease pain    SELF CARE: (see below for minutes): Procedure(s) (per grid) utilized to improve and/or restore self-care/home management as related to dressing, bathing and grooming. Required minimal verbal cueing to facilitate activities of daily living skills and compensatory activities.      Date: 1/5/21 1/7/21 (visit 2)      Modalities:  10 min         gameready cryocompression x 10 min med compression moderate cold                      Therapeutic Exercise: 25 min  35 min       Calf stretch [id number] with RLE forward keeping foot flat Reviewed and performed      Single leg stance 2 min intermittently with use of counter PRN In II bars 5 min total      Calf raises 3x10  3x10 in II bars      nustep   4 min L4      Ankle inversion/eversion  Red tband 3x10      Tandem walking  x3 laps      Step ups  6\" 3x10                       education Discussed weaning from ASO in safe environments, use of compression socks       Proprioceptive Activities:                                Manual Therapy:                        Therapeutic Activities:                                  HEP: see 1/5/21 flow sheet for specifics. [employer] Portal  Treatment/Session Summary:    · Response to Treatment:  Demonstrates good control with progression to step ups. Continues to remain apprehensive with stair mobility in the community. · Communication/Consultation:  None today  · Equipment provided today:  None today  · Recommendations/Intent for next treatment session: Next visit will focus on progression of strength and return to prior level of function.     Total Treatment Billable Duration:  45 minutes  PT Patient Time In/Time Out  Time In: 8996  Time Out: [address], DPT    Future Appointments   Date Time Provider Lyndsey Lindsey   1/8/2021 11:00 AM Yenny Deluca, [place] [facility]   1/11/2021 11:00 AM [facility]uca[facility]   1/13/2021 11:45 AM Chadwick Isabella, DPT [facility]   1/15/2021 11:00 AM Yenny Sandrine, PTA [facility]   1/18/2021 11:00 AM Yenny Sandrine, PTA [facility]   1/20/2021  2:45 PM Chadwick Isabella, DPT [facility]   1/22/2021 11:00 AM Yenny Sandrine, PTA [facility]   1/25/2021 11:00 AM Yenny Sandrine, PTA [facility]   1/27/2021  2:45 PM Chadwick Isabella, DPT [facility]   1/29/2021 11:00 AM Yenny Sandrine, PTA [facility]

## 2021-01-08 ENCOUNTER — HOSPITAL ENCOUNTER (OUTPATIENT)
Dept: PHYSICAL THERAPY | Age: 76
Discharge: HOME | End: 2021-01-08
Payer: COMMERCIAL

## 2021-01-08 PROCEDURE — 97016 VASOPNEUMATIC DEVICE THERAPY: CPT

## 2021-01-08 PROCEDURE — 97110 THERAPEUTIC EXERCISES: CPT

## 2021-01-08 NOTE — PROGRESS NOTES
Andrea Angelucci  : 1945  Primary: [employer] [facility]  Secondary:  [facility] @ [address]  [address] [place] [address]  [phone]   [phone]      OUTPATIENT PHYSICAL THERAPY: Daily Treatment Note  2021    ICD-10: Treatment Diagnosis: Pain in left leg (M79.605), Pain in right ankle and joints of right foot (M25.571) and Stiffness of right ankle, not elsewhere classified (M25.671)  Effective Dates: 2021 TO 2021 (90 days). Frequency/Duration: 3 times a week for 90 Days  GOALS: (Goals have been discussed and agreed upon with patient.)  Short-Term Functional Goals: Time Frame: 2 weeks  1. Patient will be independent with HEP. 2. Patient will improve ankle DF to 8 ° to improve mobility for symmetric gait on level surfaces. Discharge Goals: Time Frame: 4 weeks  1. Patient will be able to perform single leg stance for >10 seconds to normalize proprioception for safe mobility on all surfaces. 2. Patient will improve ankle DF to 10 ° to normalize ankle mobility for tasks such as stair descent. 3. Patient will improve ankle PF strength to 4+/5 to normalize propulsion during gait for increased gait speed. _________________________________________________________________________  Pre-treatment Symptoms/Complaints:  Pt states \"It's really tender and sore today. \"  Pain: Initial: 6/10 R ankle and pt report R knee pain    Date of surgery: 20 s/p R ankle ORIF to distal fibula    MD f/u: 21 Post Session:  6/10   Medications Last Reviewed:  2021  Updated Objective Findings:  Below measures from initial evaluation unless otherwise noted. Observation/Orthostatic Postural Assessment:    Incision is closed and healed. Ambulates with short L step length with decreased ankle rocker present. Girth: Figure 8: 53cm R, 50cm L; Calf: 38cm R, 35.5cm L  Palpation:    Mild tenderness at the anterior talocrural joint.   ROM:    Hip and knee mobility are WNL. She does note some increased soreness with swelling at the medial joint line of the right knee. Ankle DF: 0 ° R; 15 ° L  Ankle PF, In, and Ev are WNL  Strength: Ankle DF: 4/5   Ankle In: 4/5   Ankle Ev: 4-/5   Ankle PF: 4-/5, able to perform double limb calf raise, but unable to perform single limb calf raise. TREATMENT:   THERAPEUTIC ACTIVITY: ( see below for minutes): Therapeutic activities per grid below to improve mobility, strength, balance and coordination. Required minimal visual, verbal, manual and tactile cues to improve independence and safety with daily activities . THERAPEUTIC EXERCISE: (see below for minutes):  Exercises per grid below to improve mobility, strength, balance and coordination. Required minimal verbal and manual cues to promote proper body alignment, promote proper body posture and promote proper body mechanics. Progressed resistance, range, repetitions and complexity of movement as indicated. MANUAL THERAPY: (see below for minutes): Joint mobilization and Soft tissue mobilization was utilized and necessary because of the patient's restricted joint motion, painful spasm, loss of articular motion and restricted motion of soft tissue. MODALITIES: (see below for minutes):      to decrease pain    SELF CARE: (see below for minutes): Procedure(s) (per grid) utilized to improve and/or restore self-care/home management as related to dressing, bathing and grooming. Required minimal verbal cueing to facilitate activities of daily living skills and compensatory activities. Date: 1/5/21 1/7/21 (visit 2) 1/8/2021 (visit 3)     Modalities:  10 min  15 min       gameready cryocompression x 10 min med compression moderate cold Medium compression x15 min with R LE elevated.  Coldest (boot)                     Therapeutic Exercise: 25 min  35 min  35 min     Calf stretch [id number] with RLE forward keeping foot flat Reviewed and performed 1 min hold on level 1 Single leg stance 2 min intermittently with use of counter PRN In II bars 5 min total 30 sec hold x 3 with UE support     Calf raises 3x10  3x10 in II bars 3x10 with UE support     nustep   4 min L4 L2 6 min     Ankle inversion/eversion  Red tband 3x10 Black cloth band x20 (4 way)     Tandem walking  x3 laps x4 laps with UE support     Step ups  6\" 3x10       Lateral walk   x4 laps in // bars with intermittent UE support     stairs   Ascend with step over step pattern and hand on rail, SBA     education Discussed weaning from ASO in safe environments, use of compression socks       Proprioceptive Activities:                                Manual Therapy:                        Therapeutic Activities:                                  HEP: see 1/5/21 flow sheet for specifics. [employer] Portal  Treatment/Session Summary:    · Response to Treatment:  Pt reported increased pain with single leg stance and she required the use of her UEs for support and balance. Continue to progress stair training. · Communication/Consultation:  None today  · Equipment provided today:  None today  · Recommendations/Intent for next treatment session: Next visit will focus on progression of strength and return to prior level of function.     Total Treatment Billable Duration:  50 minutes  PT Patient Time In/Time Out  Time In: 1100  Time Out: 1150  Geri Azevedo PTA    Future Appointments   Date Time Provider Lyndsey Lindsey   1/11/2021 11:00 AM Claudean Darby, [place] [facility]   1/13/2021 11:45 AM TUAN MontalvoOFR [facility]   1/15/2021 11:00 AM Claudean Darby, PTA [facility]   1/18/2021 11:00 AM Claudean Darby, PTA [facility]   1/20/2021  2:45 PM TUAN MontalvoOFR [facility]   1/22/2021 11:00 AM Claudean Darby, PTA [facility]   1/25/2021 11:00 AM Claudean Darby, PTA [facility]   1/27/2021  2:45 PM Maximo Anderson DPT [facility]   1/29/2021 11:00 AM Dalton [facility]ly [facility]

## 2021-01-11 ENCOUNTER — HOSPITAL ENCOUNTER (OUTPATIENT)
Dept: PHYSICAL THERAPY | Age: 76
Discharge: HOME | End: 2021-01-11
Payer: COMMERCIAL

## 2021-01-11 PROCEDURE — 97110 THERAPEUTIC EXERCISES: CPT

## 2021-01-11 PROCEDURE — 97016 VASOPNEUMATIC DEVICE THERAPY: CPT

## 2021-01-11 NOTE — PROGRESS NOTES
Zully Kearns  : 1945  [facility] [place] [facility]  Secondary:  [address] @ [facility]  [address] [facility], [place].  [phone]   [phone]      OUTPATIENT PHYSICAL THERAPY: Daily Treatment Note  2021    ICD-10: Treatment Diagnosis: Pain in left leg (M79.605), Pain in right ankle and joints of right foot (M25.571) and Stiffness of right ankle, not elsewhere classified (M25.671)  Effective Dates: 2021 TO 2021 (90 days). Frequency/Duration: 3 times a week for 90 Days  GOALS: (Goals have been discussed and agreed upon with patient.)  Short-Term Functional Goals: Time Frame: 2 weeks  1. Patient will be independent with HEP. 2. Patient will improve ankle DF to 8 ° to improve mobility for symmetric gait on level surfaces. Discharge Goals: Time Frame: 4 weeks  1. Patient will be able to perform single leg stance for >10 seconds to normalize proprioception for safe mobility on all surfaces. 2. Patient will improve ankle DF to 10 ° to normalize ankle mobility for tasks such as stair descent. 3. Patient will improve ankle PF strength to 4+/5 to normalize propulsion during gait for increased gait speed. _________________________________________________________________________  Pre-treatment Symptoms/Complaints:  Pt states \"I propped up the leg more over the weekend and I have less swelling. \"  Pain: Initial: 2/10 R ankle and pt report R knee pain    Date of surgery: 20 s/p R ankle ORIF to distal fibula    MD f/u: 21 Post Session:  No increase   Medications Last Reviewed:  2021  Updated Objective Findings:  Below measures from initial evaluation unless otherwise noted. Observation/Orthostatic Postural Assessment:    Incision is closed and healed. Ambulates with short L step length with decreased ankle rocker present.    Girth: Figure 8: 53cm R, 50cm L; Calf: 38cm R, 35.5cm L  Palpation:    Mild tenderness at the anterior talocrural joint. ROM:    Hip and knee mobility are WNL. She does note some increased soreness with swelling at the medial joint line of the right knee. Ankle DF: 0 ° R; 15 ° L  Ankle PF, In, and Ev are WNL  Strength: Ankle DF: 4/5   Ankle In: 4/5   Ankle Ev: 4-/5   Ankle PF: 4-/5, able to perform double limb calf raise, but unable to perform single limb calf raise. TREATMENT:   THERAPEUTIC ACTIVITY: ( see below for minutes): Therapeutic activities per grid below to improve mobility, strength, balance and coordination. Required minimal visual, verbal, manual and tactile cues to improve independence and safety with daily activities . THERAPEUTIC EXERCISE: (see below for minutes):  Exercises per grid below to improve mobility, strength, balance and coordination. Required minimal verbal and manual cues to promote proper body alignment, promote proper body posture and promote proper body mechanics. Progressed resistance, range, repetitions and complexity of movement as indicated. MANUAL THERAPY: (see below for minutes): Joint mobilization and Soft tissue mobilization was utilized and necessary because of the patient's restricted joint motion, painful spasm, loss of articular motion and restricted motion of soft tissue. MODALITIES: (see below for minutes):      to decrease pain    SELF CARE: (see below for minutes): Procedure(s) (per grid) utilized to improve and/or restore self-care/home management as related to dressing, bathing and grooming. Required minimal verbal cueing to facilitate activities of daily living skills and compensatory activities. Date: 1/5/21 1/7/21 (visit 2) 1/8/2021 (visit 3) 1/11/2021 (visit 4)    Modalities:  10 min  15 min 10 min      gameready cryocompression x 10 min med compression moderate cold Medium compression x15 min with R LE elevated.  Coldest (boot) Medium compression, not the coldest setting with R LE elevated                    Therapeutic Exercise: 25 min  35 min  35 min 45 min    Calf stretch [id number] with RLE forward keeping foot flat Reviewed and performed 1 min hold on level 1 1 min hold x2    Single leg stance 2 min intermittently with use of counter PRN In II bars 5 min total 30 sec hold x 3 with UE support 30 SH x3 with UE support    Calf raises 3x10  3x10 in II bars 3x10 with UE support repeat    nustep   4 min L4 L2 6 min L4 6 min    Ankle inversion/eversion  Red tband 3x10 Black cloth band x20 (4 way) Black cloth band x20 ea direction (4 way)    Tandem walking  x3 laps x4 laps with UE support 4 laps with UE support    Step ups  6\" 3x10       Lateral walk   x4 laps in // bars with intermittent UE support 4 laps with black cloth band around feet     stairs   Ascend with step over step pattern and hand on rail, SBA Ascend with step over step pattern and one hand on rail    education Discussed weaning from ASO in safe environments, use of compression socks       Proprioceptive Activities:                                Manual Therapy:                        Therapeutic Activities:                                  HEP: see 1/5/21 flow sheet for specifics. [employer] Portal  Treatment/Session Summary:    · Response to Treatment:  Pt was able to descend and ascend the clinic steps with one hand on the rail. Pt continues to report difficulty and pain with the single leg stance. Continue POC. · Communication/Consultation:  None today  · Equipment provided today:  None today  · Recommendations/Intent for next treatment session: Next visit will focus on progression of strength and return to prior level of function.     Total Treatment Billable Duration:  55 minutes  PT Patient Time In/Time Out  Time In: 1100  Time Out: [address] [facility], hospitals    Future Appointments   Date Time Provider Lyndsey Lindsey   1/13/2021 11:45 AM TUAN [facility]GREYS[facility]   1/15/2021 11:00 AM Nelly Fischer [facility]   1/18/2021 11:00 AM Afua Martins [facility]   1/20/2021  2:45 PM Rosi Lagunas DPT [facility]   1/22/2021 11:00 AM Apurva Grier PTA [facility]   1/25/2021 11:00 AM Apurva Grier PTA [facility]   1/27/2021  2:45 PM Rosi Lagunas DPT [facility]   1/29/2021 11:00 AM Apurva Grier PTA [facility]

## 2021-01-13 ENCOUNTER — HOSPITAL ENCOUNTER (OUTPATIENT)
Dept: PHYSICAL THERAPY | Age: 76
Discharge: HOME | End: 2021-01-13
Payer: COMMERCIAL

## 2021-01-13 PROCEDURE — 97016 VASOPNEUMATIC DEVICE THERAPY: CPT

## 2021-01-13 PROCEDURE — 97110 THERAPEUTIC EXERCISES: CPT

## 2021-01-13 NOTE — PROGRESS NOTES
Jesenia Dyer  : 1945  [facility]  Secondary:  [address] @ [address], [place].  [phone]   [phone]      OUTPATIENT PHYSICAL THERAPY: Daily Treatment Note  2021    ICD-10: Treatment Diagnosis: Pain in left leg (M79.605), Pain in right ankle and joints of right foot (M25.571) and Stiffness of right ankle, not elsewhere classified (M25.671)  Effective Dates: 2021 TO 2021 (90 days). Frequency/Duration: 3 times a week for 90 Days  GOALS: (Goals have been discussed and agreed upon with patient.)  Short-Term Functional Goals: Time Frame: 2 weeks  1. Patient will be independent with HEP. 2. Patient will improve ankle DF to 8 ° to improve mobility for symmetric gait on level surfaces. Discharge Goals: Time Frame: 4 weeks  1. Patient will be able to perform single leg stance for >10 seconds to normalize proprioception for safe mobility on all surfaces. 2. Patient will improve ankle DF to 10 ° to normalize ankle mobility for tasks such as stair descent. 3. Patient will improve ankle PF strength to 4+/5 to normalize propulsion during gait for increased gait speed. _________________________________________________________________________  Pre-treatment Symptoms/Complaints:  Notes she feels like she is walking better. Continues to report pain at the anterior ankle that she describes as a \"pressure/pinching\", continued burning at the lateral ankle, and some tenderness at the posterior calcaneus. This has generally been improving. Pain: Initial: 2/10 R ankle and pt report R knee pain    Date of surgery: 20 s/p R ankle ORIF to distal fibula    MD f/u: 21 Post Session:  No increase   Medications Last Reviewed:  2021  Updated Objective Findings:  Below measures from initial evaluation unless otherwise noted.      Observation/Orthostatic Postural Assessment:    Incision is closed and healed. Ambulates with short L step length with decreased ankle rocker present. Girth: Figure 8: 53cm R, 50cm L; Calf: 38cm R, 35.5cm L  Palpation:    Mild tenderness at the anterior talocrural joint. ROM:    Hip and knee mobility are WNL. She does note some increased soreness with swelling at the medial joint line of the right knee. Ankle DF: 0 ° R; 15 ° L  Ankle PF, In, and Ev are WNL  Strength: Ankle DF: 4/5   Ankle In: 4/5   Ankle Ev: 4-/5   Ankle PF: 4-/5, able to perform double limb calf raise, but unable to perform single limb calf raise. TREATMENT:   THERAPEUTIC ACTIVITY: ( see below for minutes): Therapeutic activities per grid below to improve mobility, strength, balance and coordination. Required minimal visual, verbal, manual and tactile cues to improve independence and safety with daily activities . THERAPEUTIC EXERCISE: (see below for minutes):  Exercises per grid below to improve mobility, strength, balance and coordination. Required minimal verbal and manual cues to promote proper body alignment, promote proper body posture and promote proper body mechanics. Progressed resistance, range, repetitions and complexity of movement as indicated. MANUAL THERAPY: (see below for minutes): Joint mobilization and Soft tissue mobilization was utilized and necessary because of the patient's restricted joint motion, painful spasm, loss of articular motion and restricted motion of soft tissue. MODALITIES: (see below for minutes):      to decrease pain    SELF CARE: (see below for minutes): Procedure(s) (per grid) utilized to improve and/or restore self-care/home management as related to dressing, bathing and grooming. Required minimal verbal cueing to facilitate activities of daily living skills and compensatory activities.      Date: 1/5/21 1/7/21 (visit 2) 1/8/2021 (visit 3) 1/11/2021 (visit 4) 1/13/21 (visit 5)   Modalities:  10 min  15 min 10 min 10 min      gameready cryocompression x 10 min med compression moderate cold Medium compression x15 min with R LE elevated. Coldest (boot) Medium compression, not the coldest setting with R LE elevated repeat                   Therapeutic Exercise: 25 min  35 min  35 min 45 min 45 min    Calf stretch [id number] with RLE forward keeping foot flat Reviewed and performed 1 min hold on level 1 1 min hold x2 2x1 min L1 on incline   Single leg stance 2 min intermittently with use of counter PRN In II bars 5 min total 30 sec hold x 3 with UE support 30 SH x3 with UE support Walking march in II Bars x 3 laps   Calf raises 3x10  3x10 in II bars 3x10 with UE support repeat 3x10 in II Bars with UE support   nustep   4 min L4 L2 6 min L4 6 min L4 4 min    Ankle inversion/eversion  Red tband 3x10 Black cloth band x20 (4 way) Black cloth band x20 ea direction (4 way) Repeat into IN and EV only   Tandem walking  x3 laps x4 laps with UE support 4 laps with UE support repeat   Step ups  6\" 3x10    Lateral to 6\" step 3x10; step on/offf airex 3x1 min    Lateral walk   x4 laps in // bars with intermittent UE support 4 laps with black cloth band around feet     stairs   Ascend with step over step pattern and hand on rail, SBA Ascend with step over step pattern and one hand on rail    education Discussed weaning from ASO in safe environments, use of compression socks       Proprioceptive Activities:                                Manual Therapy:                        Therapeutic Activities:                                  HEP: see 1/5/21 flow sheet for specifics. [email or web address] [facility]  Treatment/Session Summary:    · Response to Treatment:  Limited ankle DF with restricted posterior glide of the talus contributes to discomfort at the anterior ankle. Will continue to progress with range of motion and balance.    · Communication/Consultation:  None today  · Equipment provided today:  None today  · Recommendations/Intent for next treatment session: Next visit will focus on progression of strength and return to prior level of function.     Total Treatment Billable Duration:  55 minutes  PT Patient Time In/Time Out  Time In: 1963  Time Out: [address], DPT    Future Appointments   Date Time Provider Lyndsey Lindsey   1/15/2021 11:00 AM Mateus Perales [facility]   1/18/2021 11:00 AM Yenyn Deluca, LISBETH [facility]   1/20/2021  2:45 PM Chadwick Mason DPT [facility]   1/22/2021 11:00 AM Yenny Deluca PTA [facility]   1/25/2021 11:00 AM Yenny Deluca PTA [facility]   1/27/2021  2:45 PM Chadwick Mason DPT [facility]   1/29/2021 11:00 AM Yenny Deluca, PTA [facility]

## 2021-01-15 ENCOUNTER — HOSPITAL ENCOUNTER (OUTPATIENT)
Dept: PHYSICAL THERAPY | Age: 76
Discharge: HOME | End: 2021-01-15
Payer: COMMERCIAL

## 2021-01-15 PROCEDURE — 97016 VASOPNEUMATIC DEVICE THERAPY: CPT

## 2021-01-15 PROCEDURE — 97110 THERAPEUTIC EXERCISES: CPT

## 2021-01-15 NOTE — PROGRESS NOTES
Zamzam Aguayo  : 1945  [facility]  Secondary:  Glennis Ahumada @ [address], [facility] [address].  [phone]   [phone]      OUTPATIENT PHYSICAL THERAPY: Daily Treatment Note  1/15/2021    ICD-10: Treatment Diagnosis: Pain in left leg (M79.605), Pain in right ankle and joints of right foot (M25.571) and Stiffness of right ankle, not elsewhere classified (M25.671)  Effective Dates: 2021 TO 2021 (90 days). Frequency/Duration: 3 times a week for 90 Days  GOALS: (Goals have been discussed and agreed upon with patient.)  Short-Term Functional Goals: Time Frame: 2 weeks  1. Patient will be independent with HEP. 2. Patient will improve ankle DF to 8 ° to improve mobility for symmetric gait on level surfaces. Discharge Goals: Time Frame: 4 weeks  1. Patient will be able to perform single leg stance for >10 seconds to normalize proprioception for safe mobility on all surfaces. 2. Patient will improve ankle DF to 10 ° to normalize ankle mobility for tasks such as stair descent. 3. Patient will improve ankle PF strength to 4+/5 to normalize propulsion during gait for increased gait speed. _________________________________________________________________________  Pre-treatment Symptoms/Complaints: Pt states \"The ankle is swollen today. \"  Pain: Initial: 2/10 R ankle and pt reports R knee pain    Date of surgery: 20 s/p R ankle ORIF to distal fibula    MD f/u: 21 Post Session:  No increase   Medications Last Reviewed:  1/15/2021  Updated Objective Findings:  Below measures from initial evaluation unless otherwise noted. Observation/Orthostatic Postural Assessment:    Incision is closed and healed. Ambulates with short L step length with decreased ankle rocker present. Girth: Figure 8: 53cm R, 50cm L; Calf: 38cm R, 35.5cm L  Palpation:    Mild tenderness at the anterior talocrural joint.   ROM:    Hip and knee mobility are WNL. She does note some increased soreness with swelling at the medial joint line of the right knee. Ankle DF: 0 ° R; 15 ° L  Ankle PF, In, and Ev are WNL  Strength: Ankle DF: 4/5   Ankle In: 4/5   Ankle Ev: 4-/5   Ankle PF: 4-/5, able to perform double limb calf raise, but unable to perform single limb calf raise. TREATMENT:   THERAPEUTIC ACTIVITY: ( see below for minutes): Therapeutic activities per grid below to improve mobility, strength, balance and coordination. Required minimal visual, verbal, manual and tactile cues to improve independence and safety with daily activities . THERAPEUTIC EXERCISE: (see below for minutes):  Exercises per grid below to improve mobility, strength, balance and coordination. Required minimal verbal and manual cues to promote proper body alignment, promote proper body posture and promote proper body mechanics. Progressed resistance, range, repetitions and complexity of movement as indicated. MANUAL THERAPY: (see below for minutes): Joint mobilization and Soft tissue mobilization was utilized and necessary because of the patient's restricted joint motion, painful spasm, loss of articular motion and restricted motion of soft tissue. MODALITIES: (see below for minutes):      to decrease pain    SELF CARE: (see below for minutes): Procedure(s) (per grid) utilized to improve and/or restore self-care/home management as related to dressing, bathing and grooming. Required minimal verbal cueing to facilitate activities of daily living skills and compensatory activities. Date: 1/5/21 1/7/21 (visit 2) 1/8/2021 (visit 3) 1/11/2021 (visit 4) 1/13/21 (visit 5) 1/15/21 (visit 6)   Modalities:  10 min  15 min 10 min 10 min  10 min     gameready cryocompression x 10 min med compression moderate cold Medium compression x15 min with R LE elevated.  Coldest (boot) Medium compression, not the coldest setting with R LE elevated repeat Medium compression with R LE elevated                     Therapeutic Exercise: 25 min  35 min  35 min 45 min 45 min  45 min   Calf stretch [id number] with RLE forward keeping foot flat Reviewed and performed 1 min hold on level 1 1 min hold x2 2x1 min L1 on incline 1 min hold x 2   Balance on foam pad      3 way cone taps 2x15 with intermittent UE   Single leg stance 2 min intermittently with use of counter PRN In II bars 5 min total 30 sec hold x 3 with UE support 30 SH x3 with UE support Walking march in II Bars x 3 laps Walking marches x 4 laps   Calf raises 3x10  3x10 in II bars 3x10 with UE support repeat 3x10 in II Bars with UE support 3x10 with UE support and x30 heel raises   nustep   4 min L4 L2 6 min L4 6 min L4 4 min  L3 6 min   Ankle inversion/eversion  Red tband 3x10 Black cloth band x20 (4 way) Black cloth band x20 ea direction (4 way) Repeat into IN and EV only IV and EV x20 black   Tandem walking  x3 laps x4 laps with UE support 4 laps with UE support repeat    Step ups  6\" 3x10    Lateral to 6\" step 3x10; step on/offf airex 3x1 min  10 in forward x20 and lateral x20 onto 6 in   Lateral walk   x4 laps in // bars with intermittent UE support 4 laps with black cloth band around feet      stairs   Ascend with step over step pattern and hand on rail, SBA Ascend with step over step pattern and one hand on rail     education Discussed weaning from ASO in safe environments, use of compression socks        Proprioceptive Activities:                                    Manual Therapy:                           Therapeutic Activities:                                      HEP: see 1/5/21 flow sheet for specifics. [employer] Portal  Treatment/Session Summary:    · Response to Treatment:  Pt did not report increased pain but requires the use the UEs for balance and support. Will continue to progress with range of motion and balance.    · Communication/Consultation:  None today  · Equipment provided today:  None today  · Recommendations/Intent for next treatment session: Next visit will focus on progression of strength and return to prior level of function.     Total Treatment Billable Duration:  55 minutes  PT Patient Time In/Time Out  Time In: 1100  Time Out: 1200  Tommy White PTA    Future Appointments   Date Time Provider Lyndsey Lindsey   1/18/2021 11:00 AM Dalton Aly, [facility]   1/20/2021  2:45 PM [facility]   1/22/2021 11:00 AM Dalton Jermain, PTA [facility]   1/25/2021 11:00 AM Dalton Jermain, [facility]   1/27/2021  2:45 PM Seleta Ag, DPT [facility]   1/29/2021 11:00 AM Dalton Jermain, PTA [facility]   2/1/2021 11:00 AM Seleta Ag, DPT [facility]   2/3/2021 11:00 AM Seleta Ag, DPT [facility]   2/8/2021 11:00 AM Seleta Ag, [facility]PT [facility]   2/10/2021 11:00 AM Seleta Ag, DPT [facility]   2/15/2021 11:00 AM Dalton Jermain, PTA [facility]   2/17/2021 11:00 AM Seleta Ag, DPT [facility]   2/22/2021 11:00 AM Dalton Jermain, PTA [facility]   2/24/2021 11:00 AM Seleta Ag, DPT [facility]

## 2021-01-18 ENCOUNTER — HOSPITAL ENCOUNTER (OUTPATIENT)
Dept: PHYSICAL THERAPY | Age: 76
Discharge: HOME | End: 2021-01-18
Payer: COMMERCIAL

## 2021-01-18 PROCEDURE — 97110 THERAPEUTIC EXERCISES: CPT

## 2021-01-18 PROCEDURE — 97016 VASOPNEUMATIC DEVICE THERAPY: CPT

## 2021-01-18 NOTE — PROGRESS NOTES
Eulalio Skip  : 1945  Primary: [facility]  Secondary:  Faraz Sharp [address]  [phone]   [phone]      OUTPATIENT PHYSICAL THERAPY: Daily Treatment Note  2021    ICD-10: Treatment Diagnosis: Pain in left leg (M79.605), Pain in right ankle and joints of right foot (M25.571) and Stiffness of right ankle, not elsewhere classified (M25.671)  Effective Dates: 2021 TO 2021 (90 days). Frequency/Duration: 3 times a week for 90 Days  GOALS: (Goals have been discussed and agreed upon with patient.)  Short-Term Functional Goals: Time Frame: 2 weeks  1. Patient will be independent with HEP. 2. Patient will improve ankle DF to 8 ° to improve mobility for symmetric gait on level surfaces. Discharge Goals: Time Frame: 4 weeks  1. Patient will be able to perform single leg stance for >10 seconds to normalize proprioception for safe mobility on all surfaces. 2. Patient will improve ankle DF to 10 ° to normalize ankle mobility for tasks such as stair descent. 3. Patient will improve ankle PF strength to 4+/5 to normalize propulsion during gait for increased gait speed. _________________________________________________________________________  Pre-treatment Symptoms/Complaints: Pt states \"The ankle is better but the scar is sensitive. \"  Pain: Initial: 2/10 R ankle     Date of surgery: 20 s/p R ankle ORIF to distal fibula    MD f/u: 21 Post Session:  No increase   Medications Last Reviewed:  2021  Updated Objective Findings:  Below measures from initial evaluation unless otherwise noted. Observation/Orthostatic Postural Assessment:    Incision is closed and healed. Ambulates with short L step length with decreased ankle rocker present. Girth: Figure 8: 53cm R, 50cm L; Calf: 38cm R, 35.5cm L  Palpation:    Mild tenderness at the anterior talocrural joint.   ROM:    Hip and knee mobility are WNL. She does note some increased soreness with swelling at the medial joint line of the right knee. Ankle DF: 0 ° R; 15 ° L  Ankle PF, In, and Ev are WNL  Strength: Ankle DF: 4/5   Ankle In: 4/5   Ankle Ev: 4-/5   Ankle PF: 4-/5, able to perform double limb calf raise, but unable to perform single limb calf raise. TREATMENT:   THERAPEUTIC ACTIVITY: ( see below for minutes): Therapeutic activities per grid below to improve mobility, strength, balance and coordination. Required minimal visual, verbal, manual and tactile cues to improve independence and safety with daily activities . THERAPEUTIC EXERCISE: (see below for minutes):  Exercises per grid below to improve mobility, strength, balance and coordination. Required minimal verbal and manual cues to promote proper body alignment, promote proper body posture and promote proper body mechanics. Progressed resistance, range, repetitions and complexity of movement as indicated. MANUAL THERAPY: (see below for minutes): Joint mobilization and Soft tissue mobilization was utilized and necessary because of the patient's restricted joint motion, painful spasm, loss of articular motion and restricted motion of soft tissue. MODALITIES: (see below for minutes):      to decrease pain    SELF CARE: (see below for minutes): Procedure(s) (per grid) utilized to improve and/or restore self-care/home management as related to dressing, bathing and grooming. Required minimal verbal cueing to facilitate activities of daily living skills and compensatory activities. Date: 1/5/21 1/7/21 (visit 2) 1/8/2021 (visit 3) 1/11/2021 (visit 4) 1/13/21 (visit 5) 1/15/21 (visit 6) 1/18/21 (visit 7)   Modalities:  10 min  15 min 10 min 10 min  10 min 10 min     gameready cryocompression x 10 min med compression moderate cold Medium compression x15 min with R LE elevated.  Coldest (boot) Medium compression, not the coldest setting with R LE elevated repeat Medium compression with R LE elevated repeat                       Therapeutic Exercise: 25 min  35 min  35 min 45 min 45 min  45 min 45 min   Calf stretch [id number] with RLE forward keeping foot flat Reviewed and performed 1 min hold on level 1 1 min hold x2 2x1 min L1 on incline 1 min hold x 2    Balance on foam pad      3 way cone taps 2x15 with intermittent UE    Single leg stance 2 min intermittently with use of counter PRN In II bars 5 min total 30 sec hold x 3 with UE support 30 SH x3 with UE support Walking march in II Bars x 3 laps Walking marches x 4 laps 30 sec hold x 3 with UE support   Calf raises 3x10  3x10 in II bars 3x10 with UE support repeat 3x10 in II Bars with UE support 3x10 with UE support and x30 heel raises Standing 3x10 and seated with 15# kettle bell 3x10   nustep   4 min L4 L2 6 min L4 6 min L4 4 min  L3 6 min L4 6 min   Ankle inversion/eversion  Red tband 3x10 Black cloth band x20 (4 way) Black cloth band x20 ea direction (4 way) Repeat into IN and EV only IV and EV x20 black IV and EV x30 ea black   Tandem walking  x3 laps x4 laps with UE support 4 laps with UE support repeat  4 laps in // bars   Step ups  6\" 3x10    Lateral to 6\" step 3x10; step on/offf airex 3x1 min  10 in forward x20 and lateral x20 onto 6 in    Lateral walk   x4 laps in // bars with intermittent UE support 4 laps with black cloth band around feet    4 laps with CLX band around feet in // bars   stairs   Ascend with step over step pattern and hand on rail, SBA Ascend with step over step pattern and one hand on rail      education Discussed weaning from ASO in safe environments, use of compression socks         Proprioceptive Activities:                                        Manual Therapy:                              Therapeutic Activities:                                          HEP: see 1/5/21 flow sheet for specifics.     [employer] Portal  Treatment/Session Summary:    · Response to Treatment:  Pt reports improvement with activity tolerance but she also reports scar sensitivity. The pt was instructed on how to perform scar tissue massage to decrease sensitivity. Will continue to progress with range of motion and balance. · Communication/Consultation:  None today  · Equipment provided today:  None today  · Recommendations/Intent for next treatment session: Next visit will focus on progression of strength and return to prior level of function.     Total Treatment Billable Duration:  55 minutes  PT Patient Time In/Time Out  Time In: 1100  Time Out: [address], [facility]    Future Appointments   Date Time Provider Lyndsey Rosalia   1/20/2021  2:45 PM Lanice Ax, DPT [facility]   1/22/2021 11:00 AM Davene Evens, PTA [facility]   1/25/2021 11:00 AM Davene Evens, PTA [facility]   1/27/2021  2:45 PM Lanice Ax, DPT [facility]   1/29/2021 11:00 AM Davene Evens, PTA [facility]   2/1/2021 11:00 AM Lanice Ax, DPT [facility]   2/3/2021 11:00 AM Lanice Ax, DPT [facility]   2/8/2021 11:00 AM Lanice Ax, DPT [facility]   2/10/2021 11:00 AM Lanice Ax, DPT [facility]   2/15/2021 11:00 AM Davene Evens, PTA [facility]   2/17/2021 11:00 AM Lanice Ax, DPT [facility]   2/22/2021 11:00 AM Davene Evens, PTA [facility]   2/24/2021 11:00 AM Lanice Ax, DPT [facility]

## 2021-01-20 ENCOUNTER — HOSPITAL ENCOUNTER (OUTPATIENT)
Dept: PHYSICAL THERAPY | Age: 76
Discharge: HOME | End: 2021-01-20
Payer: COMMERCIAL

## 2021-01-20 PROCEDURE — 97110 THERAPEUTIC EXERCISES: CPT

## 2021-01-20 PROCEDURE — 97016 VASOPNEUMATIC DEVICE THERAPY: CPT

## 2021-01-20 NOTE — PROGRESS NOTES
Carrie Owen  : 1945  Primary: [facility]  Secondary:  [facility]  [address] [address]  [phone]   [phone]      OUTPATIENT PHYSICAL THERAPY: Daily Treatment Note  2021    ICD-10: Treatment Diagnosis: Pain in left leg (M79.605), Pain in right ankle and joints of right foot (M25.571) and Stiffness of right ankle, not elsewhere classified (M25.671)  Effective Dates: 2021 TO 2021 (90 days). Frequency/Duration: 3 times a week for 90 Days  GOALS: (Goals have been discussed and agreed upon with patient.)  Short-Term Functional Goals: Time Frame: 2 weeks  1. Patient will be independent with HEP.   2. Patient will improve ankle DF to 8 ° to improve mobility for symmetric gait on level surfaces.   Discharge Goals: Time Frame: 4 weeks  1. Patient will be able to perform single leg stance for >10 seconds to normalize proprioception for safe mobility on all surfaces.   2. Patient will improve ankle DF to 10 ° to normalize ankle mobility for tasks such as stair descent.   3. Patient will improve ankle PF strength to 4+/5 to normalize propulsion during gait for increased gait speed.     _________________________________________________________________________  Pre-treatment Symptoms/Complaints: notes the ankle has been doing much better. Continues to have some discomfort at the anterior ankle, insertion of the achilles, and some discomfort at the incision. Also notes the medial knee has been giving her increasing trouble and pain and is interested in following up with ortho.   Pain: Initial: 2/10 R ankle     Date of surgery: 20 s/p R ankle ORIF to distal fibula    MD f/u: 21 Post Session:  No increase   Medications Last Reviewed:  2021  Updated Objective Findings:  Below measures from initial evaluation unless otherwise noted.     Observation/Orthostatic Postural Assessment:    Incision is  closed and healed. Ambulates with short L step length with decreased ankle rocker present. Girth: Figure 8: 53cm R, 50cm L; Calf: 38cm R, 35.5cm L  Palpation:    Mild tenderness at the anterior talocrural joint. ROM:    Hip and knee mobility are WNL. She does note some increased soreness with swelling at the medial joint line of the right knee. Ankle DF: 0 ° R; 15 ° L  Ankle PF, In, and Ev are WNL  Strength: Ankle DF: 4/5   Ankle In: 4/5   Ankle Ev: 4-/5   Ankle PF: 4-/5, able to perform double limb calf raise, but unable to perform single limb calf raise. TREATMENT:   THERAPEUTIC ACTIVITY: ( see below for minutes): Therapeutic activities per grid below to improve mobility, strength, balance and coordination. Required minimal visual, verbal, manual and tactile cues to improve independence and safety with daily activities . THERAPEUTIC EXERCISE: (see below for minutes):  Exercises per grid below to improve mobility, strength, balance and coordination. Required minimal verbal and manual cues to promote proper body alignment, promote proper body posture and promote proper body mechanics. Progressed resistance, range, repetitions and complexity of movement as indicated. MANUAL THERAPY: (see below for minutes): Joint mobilization and Soft tissue mobilization was utilized and necessary because of the patient's restricted joint motion, painful spasm, loss of articular motion and restricted motion of soft tissue. MODALITIES: (see below for minutes):      to decrease pain    SELF CARE: (see below for minutes): Procedure(s) (per grid) utilized to improve and/or restore self-care/home management as related to dressing, bathing and grooming. Required minimal verbal cueing to facilitate activities of daily living skills and compensatory activities.      Date: 1/5/21 1/7/21 (visit 2) 1/8/2021 (visit 3) 1/11/2021 (visit 4) 1/13/21 (visit 5) 1/15/21 (visit 6) 1/18/21 (visit 7) 1/20/21 (visit 8)    Modalities:  10 min  15 min 10 min 10 min  10 min 10 min 10 min       gameready cryocompression x 10 min med compression moderate cold Medium compression x15 min with R LE elevated.  Coldest (boot) Medium compression, not the coldest setting with R LE elevated repeat Medium compression with R LE elevated repeat repeat                            Therapeutic Exercise: 25 min  35 min  35 min 45 min 45 min  45 min 45 min 45 min     Calf stretch [id number] with RLE forward keeping foot flat Reviewed and performed 1 min hold on level 1 1 min hold x2 2x1 min L1 on incline 1 min hold x 2  2x1 min hold knees straight, 2x1 min hold knees bent L1 incline, MWM into ankle DF 2x15    Balance on foam pad      3 way cone taps 2x15 with intermittent UE  Step on/off x2 min airex in II bars    Single leg stance 2 min intermittently with use of counter PRN In II bars 5 min total 30 sec hold x 3 with UE support 30 SH x3 with UE support Walking march in II Bars x 3 laps Walking marches x 4 laps 30 sec hold x 3 with UE support     Calf raises 3x10  3x10 in II bars 3x10 with UE support repeat 3x10 in II Bars with UE support 3x10 with UE support and x30 heel raises Standing 3x10 and seated with 15# kettle bell 3x10 Seated 20# 3x10, standing B 3x10 L1 incline    nustep   4 min L4 L2 6 min L4 6 min L4 4 min  L3 6 min L4 6 min L4 5 min     Ankle inversion/eversion  Red tband 3x10 Black cloth band x20 (4 way) Black cloth band x20 ea direction (4 way) Repeat into IN and EV only IV and EV x20 black IV and EV x30 ea black repeat    Tandem walking  x3 laps x4 laps with UE support 4 laps with UE support repeat  4 laps in // bars     Step ups  6\" 3x10    Lateral to 6\" step 3x10; step on/offf airex 3x1 min  10 in forward x20 and lateral x20 onto 6 in      Lateral walk   x4 laps in // bars with intermittent UE support 4 laps with black cloth band around feet    4 laps with CLX band around feet in // bars 6 laps black CLX    stairs   Ascend with step over step pattern and hand on rail, SBA Ascend with step over step pattern and one hand on rail        education Discussed weaning from ASO in safe environments, use of compression socks           Proprioceptive Activities:                                                Manual Therapy:                                    Therapeutic Activities:                                                  HEP: see 1/5/21 flow sheet for specifics. "[employer] Portal  Treatment/Session Summary:    · Response to Treatment:  Continues to have some anterior impingement with ankle DF limiting ROM. · Communication/Consultation:  None today  · Equipment provided today:  None today  · Recommendations/Intent for next treatment session: Next visit will focus on progression of strength and return to prior level of function.     Total Treatment Billable Duration:  55 minutes  PT Patient Time In/Time Out  Time In: 9550  Time Out: 29[address] Keaton Acuña, DPT    Future Appointments   Date Time Provider Lyndsey Lindsey   1/22/2021 11:00 AM Mateus Navarrete [facility]   1/25/2021 11:00 AM Alessio Bridge, DPT [facility]   1/27/2021  2:45 PM Alessio Bridge, DPT [facility]   1/29/2021 11:00 AM Shawn Escalera, PTA [facility]   2/1/2021 11:00 AM Alessio Bridge, DPT [facility]   2/3/2021 11:00 AM Alessio Bridge, DPT [facility]   2/8/2021 11:00 AM Alessio Bridge, DPT [facility]   2/10/2021 11:00 AM Alessio Bridge, DPT [facility]   2/15/2021 11:00 AM Alessio Bridge, DPT [facility]   2/17/2021 11:00 AM Alessio Bridge, DPT [facility]   2/22/2021 11:00 AM Alessio Bridge, DPT [facility]   2/24/2021 11:00 AM Alessio Bridge, DPT [facility]

## 2021-01-22 ENCOUNTER — HOSPITAL ENCOUNTER (OUTPATIENT)
Dept: PHYSICAL THERAPY | Age: 76
Discharge: HOME | End: 2021-01-22
Payer: COMMERCIAL

## 2021-01-22 PROCEDURE — 97016 VASOPNEUMATIC DEVICE THERAPY: CPT

## 2021-01-22 PROCEDURE — 97110 THERAPEUTIC EXERCISES: CPT

## 2021-01-22 NOTE — PROGRESS NOTES
Casey Ace  : 1945  [facility]  Secondary:  Jr Diaz @ [address], [place] [address].  [phone]   [phone]      OUTPATIENT PHYSICAL THERAPY: Daily Treatment Note  2021    ICD-10: Treatment Diagnosis: Pain in left leg (M79.605), Pain in right ankle and joints of right foot (M25.571) and Stiffness of right ankle, not elsewhere classified (M25.671)  Effective Dates: 2021 TO 2021 (90 days). Frequency/Duration: 3 times a week for 90 Days  GOALS: (Goals have been discussed and agreed upon with patient.)  Short-Term Functional Goals: Time Frame: 2 weeks  1. Patient will be independent with HEP. 2. Patient will improve ankle DF to 8 ° to improve mobility for symmetric gait on level surfaces. Discharge Goals: Time Frame: 4 weeks  1. Patient will be able to perform single leg stance for >10 seconds to normalize proprioception for safe mobility on all surfaces. 2. Patient will improve ankle DF to 10 ° to normalize ankle mobility for tasks such as stair descent. 3. Patient will improve ankle PF strength to 4+/5 to normalize propulsion during gait for increased gait speed. _________________________________________________________________________  Pre-treatment Symptoms/Complaints: notes she has had episodes over the past few days of feeling \"normal.\" still is limited with tolerance to prolonged walking and stiffness with prolonged sitting. Pain: Initial: 2/10 R ankle     Date of surgery: 20 s/p R ankle ORIF to distal fibula    MD f/u: 21 Post Session:  No increase   Medications Last Reviewed:  2021  Updated Objective Findings:  Below measures from initial evaluation unless otherwise noted. Observation/Orthostatic Postural Assessment:    Incision is closed and healed. Ambulates with short L step length with decreased ankle rocker present.    Girth: Figure 8: 53cm R, 50cm L; Calf: 38cm R, 35.5cm L  Palpation:    Mild tenderness at the anterior talocrural joint. ROM:    Hip and knee mobility are WNL. She does note some increased soreness with swelling at the medial joint line of the right knee. Ankle DF: 0 ° R; 15 ° L  Ankle PF, In, and Ev are WNL  Strength: Ankle DF: 4/5   Ankle In: 4/5   Ankle Ev: 4-/5   Ankle PF: 4-/5, able to perform double limb calf raise, but unable to perform single limb calf raise. TREATMENT:   THERAPEUTIC ACTIVITY: ( see below for minutes): Therapeutic activities per grid below to improve mobility, strength, balance and coordination. Required minimal visual, verbal, manual and tactile cues to improve independence and safety with daily activities . THERAPEUTIC EXERCISE: (see below for minutes):  Exercises per grid below to improve mobility, strength, balance and coordination. Required minimal verbal and manual cues to promote proper body alignment, promote proper body posture and promote proper body mechanics. Progressed resistance, range, repetitions and complexity of movement as indicated. MANUAL THERAPY: (see below for minutes): Joint mobilization and Soft tissue mobilization was utilized and necessary because of the patient's restricted joint motion, painful spasm, loss of articular motion and restricted motion of soft tissue. MODALITIES: (see below for minutes):      to decrease pain    SELF CARE: (see below for minutes): Procedure(s) (per grid) utilized to improve and/or restore self-care/home management as related to dressing, bathing and grooming. Required minimal verbal cueing to facilitate activities of daily living skills and compensatory activities.      Date: 1/5/21 1/7/21 (visit 2) 1/8/2021 (visit 3) 1/11/2021 (visit 4) 1/13/21 (visit 5) 1/15/21 (visit 6) 1/18/21 (visit 7) 1/20/21 (visit 8) 1/22/21 (visit 9)   Modalities:  10 min  15 min 10 min 10 min  10 min 10 min 10 min  10 min     gameready cryocompression x 10 min med compression moderate cold Medium compression x15 min with R LE elevated.  Coldest (boot) Medium compression, not the coldest setting with R LE elevated repeat Medium compression with R LE elevated repeat repeat Med compression with RLE elevated for swelling and pain                           Therapeutic Exercise: 25 min  35 min  35 min 45 min 45 min  45 min 45 min 45 min  45 min   Calf stretch [id number] with RLE forward keeping foot flat Reviewed and performed 1 min hold on level 1 1 min hold x2 2x1 min L1 on incline 1 min hold x 2  2x1 min hold knees straight, 2x1 min hold knees bent L1 incline, MWM into ankle DF 2x15 2x1 min knees straight, 2x1 min knees bent L1, MWM into ankle DF 2x15   Balance on foam pad      3 way cone taps 2x15 with intermittent UE  Step on/off x2 min airex in II bars repeat   Single leg stance 2 min intermittently with use of counter PRN In II bars 5 min total 30 sec hold x 3 with UE support 30 SH x3 with UE support Walking march in II Bars x 3 laps Walking marches x 4 laps 30 sec hold x 3 with UE support     Calf raises 3x10  3x10 in II bars 3x10 with UE support repeat 3x10 in II Bars with UE support 3x10 with UE support and x30 heel raises Standing 3x10 and seated with 15# kettle bell 3x10 Seated 20# 3x10, standing B 3x10 L1 incline Seated 20# 4x10, standing B 3x10 L1 incline   nustep   4 min L4 L2 6 min L4 6 min L4 4 min  L3 6 min L4 6 min L4 5 min  L4 5 min    Ankle inversion/eversion  Red tband 3x10 Black cloth band x20 (4 way) Black cloth band x20 ea direction (4 way) Repeat into IN and EV only IV and EV x20 black IV and EV x30 ea black repeat In, Ev, and DF black 3x10   Tandem walking  x3 laps x4 laps with UE support 4 laps with UE support repeat  4 laps in // bars     Step ups  6\" 3x10    Lateral to 6\" step 3x10; step on/offf airex 3x1 min  10 in forward x20 and lateral x20 onto 6 in      Lateral walk   x4 laps in // bars with intermittent UE support 4 laps with black cloth band around feet    4 laps with CLX band around feet in // bars 6 laps black CLX 6 laps black CLX   stairs   Ascend with step over step pattern and hand on rail, SBA Ascend with step over step pattern and one hand on rail        education Discussed weaning from ASO in safe environments, use of compression socks           Proprioceptive Activities:                                                Manual Therapy:                                    Therapeutic Activities:                                                  HEP: see 1/5/21 flow sheet for specifics. [employer] Portal  Treatment/Session Summary:    · Response to Treatment:  Progressed home program and given blue CLX band to perform inversion, eversion, and dorsiflexion for HEP. Ankle mobility continues to progress but she continues to have to descend stairs laterally because of increased medial knee pain. · Communication/Consultation:  None today  · Equipment provided today:  None today 1/22/21 given 5 loops of blue CLX for in, ev, and DF for HEP. · Recommendations/Intent for next treatment session: Next visit will focus on progression of strength and return to prior level of function.     Total Treatment Billable Duration:  55 minutes  PT Patient Time In/Time Out  Time In: 1100  Time Out: Ronald Novoa 107, DPT    Future Appointments   Date Time Provider Lyndsey Lindsey   1/25/2021 11:00 AM Mirela Cunningham DPT [facility]   1/27/2021  2:45 PM TUAN GagnonOFGREYSON [facility]   1/29/2021 11:00 AM Joshua Polanco PTA [facility]   2/1/2021 11:00 AM TUAN Gagnon [facility]   2/3/2021 11:00 AM TAUN GagnonOFR [facility]   2/8/2021 11:00 AM TUAN GagnonOFGREYSON [facility]   2/10/2021 11:00 AM Mirela Cunningham DPT [facility]   2/15/2021 11:00 AM Mirela Cunningham DPT [facility]   2/17/2021 11:00 AM Mirela Cunningham DPT [facility]   2/22/2021 11:00 AM Mirela Cunningham DPT [facility]   2/24/2021 11:00 [facility]

## 2021-01-25 ENCOUNTER — HOSPITAL ENCOUNTER (OUTPATIENT)
Dept: PHYSICAL THERAPY | Age: 76
Discharge: HOME | End: 2021-01-25
Payer: COMMERCIAL

## 2021-01-25 PROCEDURE — 97110 THERAPEUTIC EXERCISES: CPT

## 2021-01-25 PROCEDURE — 97016 VASOPNEUMATIC DEVICE THERAPY: CPT

## 2021-01-25 NOTE — PROGRESS NOTES
I am accessing Ms. Owen's chart as a part of our department's internal chart auditing process. I certify that Ms. Pooja Macias is, or was, a patient in our department.   Thank you,  Brandy Levy, PT  1/25/2021

## 2021-01-25 NOTE — PROGRESS NOTES
Shasta Johnson  : 1945  Primary: [facility]  Secondary:  [address] @ [address], [place].  [phone]   [phone]      OUTPATIENT PHYSICAL THERAPY: Daily Treatment Note and Progress Note  2021    ICD-10: Treatment Diagnosis: Pain in left leg (M79.605), Pain in right ankle and joints of right foot (M25.571) and Stiffness of right ankle, not elsewhere classified (M25.671)  Effective Dates: 2021 TO 2021 (90 days). Frequency/Duration: 3 times a week for 90 Days  GOALS: (Goals have been discussed and agreed upon with patient.)  Short-Term Functional Goals: Time Frame: 2 weeks MET  1. Patient will be independent with HEP. 2. Patient will improve ankle DF to 8 ° to improve mobility for symmetric gait on level surfaces. Discharge Goals: Time Frame: 4 weeks  1. Patient will be able to perform single leg stance for >10 seconds to normalize proprioception for safe mobility on all surfaces. ON GOING  2. Patient will improve ankle DF to 10 ° to normalize ankle mobility for tasks such as stair descent. MET  3. Patient will improve ankle PF strength to 4+/5 to normalize propulsion during gait for increased gait speed. ON GOING     _________________________________________________________________________  Pre-treatment Symptoms/Complaints: continuing to have prolonged periods of comfort. Feels like this is most closely associated with swelling. Pain: Initial: 2/10 R ankle     Date of surgery: 20 s/p R ankle ORIF to distal fibula    MD f/u: 21 Post Session:  No increase   Medications Last Reviewed:  2021  Updated Objective Findings:  Below measures from initial evaluation unless otherwise noted. Observation/Orthostatic Postural Assessment:    Incision is closed and healed. Ambulates with short L step length with decreased ankle rocker present.    Girth: Figure 8: 53cm R, 50cm L; Calf: 38cm R, 35.5cm L  Palpation:    Mild tenderness at the anterior talocrural joint.  ROM:    Hip and knee mobility are WNL. She does note some increased soreness with swelling at the medial joint line of the right knee.   Ankle DF: 0 ° R; 15 ° L  Ankle PF, In, and Ev are WNL  Strength:   Ankle DF: 4/5   Ankle In: 4/5   Ankle Ev: 4-/5   Ankle PF: 4-/5, able to perform double limb calf raise, but unable to perform single limb calf raise.    1/25/21 update:   Ankle DF: 12 °   Calf strength is 4/5   Maintains SLS for 4-5 seconds     TREATMENT:   THERAPEUTIC ACTIVITY: ( see below for minutes):  Therapeutic activities per grid below to improve mobility, strength, balance and coordination.  Required minimal visual, verbal, manual and tactile cues to improve independence and safety with daily activities .  THERAPEUTIC EXERCISE: (see below for minutes):  Exercises per grid below to improve mobility, strength, balance and coordination.  Required minimal verbal and manual cues to promote proper body alignment, promote proper body posture and promote proper body mechanics.  Progressed resistance, range, repetitions and complexity of movement as indicated.  MANUAL THERAPY: (see below for minutes): Joint mobilization and Soft tissue mobilization was utilized and necessary because of the patient's restricted joint motion, painful spasm, loss of articular motion and restricted motion of soft tissue.   MODALITIES: (see below for minutes):      to decrease pain    SELF CARE: (see below for minutes): Procedure(s) (per grid) utilized to improve and/or restore self-care/home management as related to dressing, bathing and grooming. Required minimal verbal cueing to facilitate activities of daily living skills and compensatory activities.     Date: 1/5/21 1/7/21 (visit 2) 1/8/2021 (visit 3) 1/11/2021 (visit 4) 1/13/21 (visit 5) 1/15/21 (visit 6) 1/18/21 (visit 7) 1/20/21 (visit 8) 1/22/21 (visit 9) 1/25/21 (visit 10)    Modalities:  10 min  15 min  10 min 10 min  10 min 10 min 10 min  10 min 10 min      gameready cryocompression x 10 min med compression moderate cold Medium compression x15 min with R LE elevated.  Coldest (boot) Medium compression, not the coldest setting with R LE elevated repeat Medium compression with R LE elevated repeat repeat Med compression with RLE elevated for swelling and pain repeat                                Therapeutic Exercise: 25 min  35 min  35 min 45 min 45 min  45 min 45 min 45 min  45 min 45 min    Calf stretch [id number] with RLE forward keeping foot flat Reviewed and performed 1 min hold on level 1 1 min hold x2 2x1 min L1 on incline 1 min hold x 2  2x1 min hold knees straight, 2x1 min hold knees bent L1 incline, MWM into ankle DF 2x15 2x1 min knees straight, 2x1 min knees bent L1, MWM into ankle DF 2x15 repeat    Balance on foam pad      3 way cone taps 2x15 with intermittent UE  Step on/off x2 min airex in II bars repeat     Single leg stance 2 min intermittently with use of counter PRN In II bars 5 min total 30 sec hold x 3 with UE support 30 SH x3 with UE support Walking march in II Bars x 3 laps Walking [employer] x 4 laps 30 sec hold x 3 with UE support       Calf raises 3x10  3x10 in II bars 3x10 with UE support repeat 3x10 in II Bars with UE support 3x10 with UE support and x30 heel raises Standing 3x10 and seated with 15# kettle bell 3x10 Seated 20# 3x10, standing B 3x10 L1 incline Seated 20# 4x10, standing B 3x10 L1 incline Seated 20# 4x10; standing L1 incline B 4x10    nustep   4 min L4 L2 6 min L4 6 min L4 4 min  L3 6 min L4 6 min L4 5 min  L4 5 min  L4 5 min     Ankle inversion/eversion  Red tband 3x10 Black cloth band x20 (4 way) Black cloth band x20 ea direction (4 way) Repeat into IN and EV only IV and EV x20 black IV and EV x30 ea black repeat In, Ev, and DF black 3x10 repeat    Tandem walking  x3 laps x4 laps with UE support 4 laps with UE support repeat  4 laps in // bars   x4 laps in II bars, walking march x 4 laps in II bars    Step ups  6\" 3x10    Lateral to 6\" step 3x10; step on/offf airex 3x1 min  10 in forward x20 and lateral x20 onto 6 in        Lateral walk   x4 laps in // bars with intermittent UE support 4 laps with black cloth band around feet    4 laps with CLX band around feet in // bars 6 laps black CLX 6 laps black CLX 6 laps black CLX    stairs   Ascend with step over step pattern and hand on rail, SBA Ascend with step over step pattern and one hand on rail          education Discussed weaning from ASO in safe environments, use of compression socks             Proprioceptive Activities:                                                        Manual Therapy:                                          Therapeutic Activities:                                                          HEP: see 1/5/21 flow sheet for specifics. [employer] Portal  Treatment/Session Summary:    · Response to Treatment:  Ankle mobility is improving. Continues to lack strength to perform single limb heel raise. · Communication/Consultation:  None today  · Equipment provided today:  None today 1/22/21 given 5 loops of blue CLX for in, ev, and DF for HEP. · Recommendations/Intent for next treatment session: Next visit will focus on progression of strength and return to prior level of function.     Total Treatment Billable Duration:  55 minutes  PT Patient Time In/Time Out  Time In: 1100  Time Out: UlTimur Novoa 107, DPT    Future Appointments   Date Time Provider Lyndsey Lindsey   1/27/2021  2:45 PM Vanita Portillo DPT [facility]   1/29/2021 11:00 AM Francisco J Clemons PTA [facility]   2/1/2021 11:00 AM Lanvahid Ax DPT [facility]   2/3/2021 11:00 AM Vanita Ax DPT [facility]   2/8/2021 11:00 AM Vanita Ax DPT [facility]   2/10/2021 11:00 AM Vanita AxCARRIT [facility]   2/15/2021 11:00 AM Vanita Ax DPT [facility]   2/17/2021 11:00 AM Kacey Guzman DPT [facility]   2/22/2021 11:00 AM Kacey Guzman DPT [facility]   2/24/2021 11:00 AM Kacey Guzman DPT [facility]

## 2021-01-27 ENCOUNTER — HOSPITAL ENCOUNTER (OUTPATIENT)
Dept: PHYSICAL THERAPY | Age: 76
Discharge: HOME | End: 2021-01-27
Payer: COMMERCIAL

## 2021-01-27 PROCEDURE — 97110 THERAPEUTIC EXERCISES: CPT

## 2021-01-27 PROCEDURE — 97016 VASOPNEUMATIC DEVICE THERAPY: CPT

## 2021-01-27 NOTE — PROGRESS NOTES
Lorri Coulter  : 1945  Primary: [facility]  Secondary:  [address] [place] @ [address], [place].  [phone]   [phone]      OUTPATIENT PHYSICAL THERAPY: Daily Treatment Note  2021    ICD-10: Treatment Diagnosis: Pain in left leg (M79.605), Pain in right ankle and joints of right foot (M25.571) and Stiffness of right ankle, not elsewhere classified (M25.671)  Effective Dates: 2021 TO 2021 (90 days). Frequency/Duration: 3 times a week for 90 Days  GOALS: (Goals have been discussed and agreed upon with patient.)  Short-Term Functional Goals: Time Frame: 2 weeks MET  1. Patient will be independent with HEP. 2. Patient will improve ankle DF to 8 ° to improve mobility for symmetric gait on level surfaces. Discharge Goals: Time Frame: 4 weeks  1. Patient will be able to perform single leg stance for >10 seconds to normalize proprioception for safe mobility on all surfaces. ON GOING  2. Patient will improve ankle DF to 10 ° to normalize ankle mobility for tasks such as stair descent. MET  3. Patient will improve ankle PF strength to 4+/5 to normalize propulsion during gait for increased gait speed. ON GOING     _________________________________________________________________________  Pre-treatment Symptoms/Complaints: Notes the ankle has continued to feel better. Pain: Initial: 2/10 R ankle     Date of surgery: 20 s/p R ankle ORIF to distal fibula    MD f/u: 21 Post Session:  No increase   Medications Last Reviewed:  2021  Updated Objective Findings:  Below measures from initial evaluation unless otherwise noted. Observation/Orthostatic Postural Assessment:    Incision is closed and healed. Ambulates with short L step length with decreased ankle rocker present.    Girth: Figure 8: 53cm R, 50cm L; Calf: 38cm R, 35.5cm L  Palpation:    Mild tenderness at the anterior talocrural joint.  ROM:    Hip and knee mobility are WNL. She does note some increased soreness with swelling at the medial joint line of the right knee. Ankle DF: 0 ° R; 15 ° L  Ankle PF, In, and Ev are WNL  Strength: Ankle DF: 4/5   Ankle In: 4/5   Ankle Ev: 4-/5   Ankle PF: 4-/5, able to perform double limb calf raise, but unable to perform single limb calf raise. 1/25/21 update: Ankle DF: 12 °   Calf strength is 4/5   Maintains SLS for 4-5 seconds     TREATMENT:   THERAPEUTIC ACTIVITY: ( see below for minutes): Therapeutic activities per grid below to improve mobility, strength, balance and coordination. Required minimal visual, verbal, manual and tactile cues to improve independence and safety with daily activities . THERAPEUTIC EXERCISE: (see below for minutes):  Exercises per grid below to improve mobility, strength, balance and coordination. Required minimal verbal and manual cues to promote proper body alignment, promote proper body posture and promote proper body mechanics. Progressed resistance, range, repetitions and complexity of movement as indicated. MANUAL THERAPY: (see below for minutes): Joint mobilization and Soft tissue mobilization was utilized and necessary because of the patient's restricted joint motion, painful spasm, loss of articular motion and restricted motion of soft tissue. MODALITIES: (see below for minutes):      to decrease pain    SELF CARE: (see below for minutes): Procedure(s) (per grid) utilized to improve and/or restore self-care/home management as related to dressing, bathing and grooming. Required minimal verbal cueing to facilitate activities of daily living skills and compensatory activities.      Date: 1/15/21 (visit 6) 1/18/21 (visit 7) 1/20/21 (visit 8) 1/22/21 (visit 9) 1/25/21 (visit 10) progress note 1/27/21 (visit 11)   Modalities: 10 min 10 min 10 min  10 min 10 min 10 min     Medium compression with R LE elevated repeat repeat Med compression with RLE elevated for swelling and pain repeat Med compression moderate temp gameready for swelling andsoreness                     Therapeutic Exercise: 45 min 45 min 45 min  45 min 45 min 45 min    Calf stretch 1 min hold x 2  2x1 min hold knees straight, 2x1 min hold knees bent L1 incline, MWM into ankle DF 2x15 2x1 min knees straight, 2x1 min knees bent L1, MWM into ankle DF 2x15 repeat 2x1 min knees straight, 2x1 min knees bent; MWM into ankle DF 2x15   Balance on foam pad 3 way cone taps 2x15 with intermittent UE  Step on/off x2 min airex in II bars repeat     Single leg stance Walking marches x 4 laps 30 sec hold x 3 with UE support       Calf raises 3x10 with UE support and x30 heel raises Standing 3x10 and seated with 15# kettle bell 3x10 Seated 20# 3x10, standing B 3x10 L1 incline Seated 20# 4x10, standing B 3x10 L1 incline Seated 20# 4x10; standing L1 incline B 4x10 Seated 20# 3x15; standing L1 incline 3x15   nustep  L3 6 min L4 6 min L4 5 min  L4 5 min  L4 5 min  L4 5 min    Ankle inversion/eversion IV and EV x20 black IV and EV x30 ea black repeat In, Ev, and DF black 3x10 repeat In and Ev black 3x15   Tandem walking  4 laps in // bars   x4 laps in II bars, walking march x 4 laps in II bars x4 laps in ii bars, walking march x 4 laps, lateral band walk x 4 laps black CLX; single leg stance 5 min total   Step ups 10 in forward x20 and lateral x20 onto 6 in        Lateral walk  4 laps with CLX band around feet in // bars 6 laps black CLX 6 laps black CLX 6 laps black CLX    stairs         education         Proprioceptive Activities:                                    Manual Therapy:                           Therapeutic Activities:                                      HEP: see 1/5/21 flow sheet for specifics.    [employer] Portal  Treatment/Session Summary:    · Response to Treatment:  Improving ankle righting reactions with single leg stance but is only able to hold for approximately 5 seconds.  · Communication/Consultation:  None today  · Equipment provided today:  None today 1/22/21 given 5 loops of blue CLX for in, ev, and DF for HEP. · Recommendations/Intent for next treatment session: Next visit will focus on progression of strength and return to prior level of function.     Total Treatment Billable Duration:  55 minutes  PT Patient Time In/Time Out  Time In: 5625  Time Out: 33455 Keaton Acuña, DPT    Future Appointments   Date Time Provider Lyndsey Lindsey   1/29/2021 11:00 AM Mateus Ordoñez [facility]   2/1/2021 11:00 AM Leta Tripp DPT [facility]   2/3/2021 11:00 AM Leta Tripp DPT [facility]   2/8/2021 11:00 AM Leta Tripp DPT [facility]   2/10/2021 11:00 AM Leta Tripp DPT [facility]   2/15/2021 11:00 AM Leta Tripp DPT [facility]   2/17/2021 11:00 AM Leta Tripp DPT [facility]   2/22/2021 11:00 AM Leta Tripp DPT [facility]   2/24/2021 11:00 AM Leta Tripp DPT [facility]

## 2021-01-29 ENCOUNTER — HOSPITAL ENCOUNTER (OUTPATIENT)
Dept: PHYSICAL THERAPY | Age: 76
Discharge: HOME | End: 2021-01-29
Payer: COMMERCIAL

## 2021-01-29 PROCEDURE — 97016 VASOPNEUMATIC DEVICE THERAPY: CPT

## 2021-01-29 PROCEDURE — 97110 THERAPEUTIC EXERCISES: CPT

## 2021-01-29 NOTE — PROGRESS NOTES
Krystina Álvarez  : 1945  Primary: [facility]  Secondary:  [address] @ [address]  [address] [place] [address], [place] [address].  [phone]   [phone]      OUTPATIENT PHYSICAL THERAPY: Daily Treatment Note  2021    ICD-10: Treatment Diagnosis: Pain in left leg (M79.605), Pain in right ankle and joints of right foot (M25.571) and Stiffness of right ankle, not elsewhere classified (M25.671)  Effective Dates: 2021 TO 2021 (90 days). Frequency/Duration: 3 times a week for 90 Days  GOALS: (Goals have been discussed and agreed upon with patient.)  Short-Term Functional Goals: Time Frame: 2 weeks MET  1. Patient will be independent with HEP. 2. Patient will improve ankle DF to 8 ° to improve mobility for symmetric gait on level surfaces. Discharge Goals: Time Frame: 4 weeks  1. Patient will be able to perform single leg stance for >10 seconds to normalize proprioception for safe mobility on all surfaces. ON GOING  2. Patient will improve ankle DF to 10 ° to normalize ankle mobility for tasks such as stair descent. MET  3. Patient will improve ankle PF strength to 4+/5 to normalize propulsion during gait for increased gait speed. ON GOING     _________________________________________________________________________  Pre-treatment Symptoms/Complaints: Pt reports mild pain in the dorsal surface of the foot. Pain: Initial: 2/10 R ankle     Date of surgery: 20 s/p R ankle ORIF to distal fibula    MD f/u: 21 Post Session:  No increase   Medications Last Reviewed:  2021  Updated Objective Findings:  Below measures from initial evaluation unless otherwise noted. Observation/Orthostatic Postural Assessment:    Incision is closed and healed. Ambulates with short L step length with decreased ankle rocker present.    Girth: Figure 8: 53cm R, 50cm L; Calf: 38cm R, 35.5cm L  Palpation:    Mild tenderness at the anterior talocrural joint.  ROM:    Hip and knee mobility are WNL. She does note some increased soreness with swelling at the medial joint line of the right knee. Ankle DF: 0 ° R; 15 ° L  Ankle PF, In, and Ev are WNL  Strength: Ankle DF: 4/5   Ankle In: 4/5   Ankle Ev: 4-/5   Ankle PF: 4-/5, able to perform double limb calf raise, but unable to perform single limb calf raise. 1/25/21 update: Ankle DF: 12 °   Calf strength is 4/5   Maintains SLS for 4-5 seconds     TREATMENT:   THERAPEUTIC ACTIVITY: ( see below for minutes): Therapeutic activities per grid below to improve mobility, strength, balance and coordination. Required minimal visual, verbal, manual and tactile cues to improve independence and safety with daily activities . THERAPEUTIC EXERCISE: (see below for minutes):  Exercises per grid below to improve mobility, strength, balance and coordination. Required minimal verbal and manual cues to promote proper body alignment, promote proper body posture and promote proper body mechanics. Progressed resistance, range, repetitions and complexity of movement as indicated. MANUAL THERAPY: (see below for minutes): Joint mobilization and Soft tissue mobilization was utilized and necessary because of the patient's restricted joint motion, painful spasm, loss of articular motion and restricted motion of soft tissue. MODALITIES: (see below for minutes):      to decrease pain    SELF CARE: (see below for minutes): Procedure(s) (per grid) utilized to improve and/or restore self-care/home management as related to dressing, bathing and grooming. Required minimal verbal cueing to facilitate activities of daily living skills and compensatory activities.      Date: 1/15/21 (visit 6) 1/18/21 (visit 7) 1/20/21 (visit 8) 1/22/21 (visit 9) 1/25/21 (visit 10) progress note 1/27/21 (visit 11) 1/29/21 (visit 12)   Modalities: 10 min 10 min 10 min  10 min 10 min 10 min  10 min    Medium compression with R LE elevated repeat repeat Med compression with RLE elevated for swelling and pain repeat Med compression moderate temp gameready for swelling andsoreness Med compression with R LE elevated                       Therapeutic Exercise: 45 min 45 min 45 min  45 min 45 min 45 min  45 min   Calf stretch 1 min hold x 2  2x1 min hold knees straight, 2x1 min hold knees bent L1 incline, MWM into ankle DF 2x15 2x1 min knees straight, 2x1 min knees bent L1, MWM into ankle DF 2x15 repeat 2x1 min knees straight, 2x1 min knees bent; MWM into ankle DF 2x15    Balance on foam pad 3 way cone taps 2x15 with intermittent UE  Step on/off x2 min airex in II bars repeat      Single leg stance Walking marches x 4 laps 30 sec hold x 3 with UE support        Calf raises 3x10 with UE support and x30 heel raises Standing 3x10 and seated with 15# kettle bell 3x10 Seated 20# 3x10, standing B 3x10 L1 incline Seated 20# 4x10, standing B 3x10 L1 incline Seated 20# 4x10; standing L1 incline B 4x10 Seated 20# 3x15; standing L1 incline 3x15 Seated x30, 20# and standing 3x10   nustep  L3 6 min L4 6 min L4 5 min  L4 5 min  L4 5 min  L4 5 min  L6 6 min   Ankle inversion/eversion IV and EV x20 black IV and EV x30 ea black repeat In, Ev, and DF black 3x10 repeat In and Ev black 3x15 Standing DF 3x10   Tandem walking  4 laps in // bars   x4 laps in II bars, walking march x 4 laps in II bars x4 laps in ii bars, walking march x 4 laps, lateral band walk x 4 laps black CLX; single leg stance 5 min total Lateral walk 4 L with black CLX band.  Marching 4 laps, no UE support   Step ups 10 in forward x20 and lateral x20 onto 6 in         Lateral walk  4 laps with CLX band around feet in // bars 6 laps black CLX 6 laps black CLX 6 laps black CLX     stairs          education          Proprioceptive Activities:                                        Manual Therapy:                              Therapeutic Activities:                                          HEP: see 1/5/21 flow sheet for specifics. [employer] [facility]  Treatment/Session Summary:    · Response to Treatment: Pt did not report increased pain and demonstrates improved balance. Continue. 2/12/21 addendum: Patient had follow up with MD and was discharged from therapy. Will d/c to HEP. · Communication/Consultation:  None today  · Equipment provided today:  None today 1/22/21 given 5 loops of blue CLX for in, ev, and DF for HEP. · Recommendations/Intent for next treatment session: d/c'd by MD to HEP.      Total Treatment Billable Duration:  55 minutes  PT Patient Time In/Time Out  Time In: 1100  Time Out: 1200  Jeannie Reza PTA    Future Appointments   Date Time Provider Lyndsey Lindsey   2/1/2021 11:00 AM Leta Tripp DPT [facility]   2/3/2021 11:00 AM [facility]   2/8/2021 11:00 AM CARRI RussoT [facility]   2/10/2021 11:00 AM [facility]   2/15/2021 11:00 AM Leta Tripp, CARRIT [facility]   2/17/2021 11:00 AM Leta Tripp, CARR[facility]   2/22/2021 11:00 AM Leta Tripp, CARR[facility]   2/24/2021 11:00 AM Leta Tripp, CARRIT [facility]

## 2021-02-01 ENCOUNTER — APPOINTMENT (OUTPATIENT)
Dept: PHYSICAL THERAPY | Age: 76
End: 2021-02-01

## 2021-02-03 ENCOUNTER — APPOINTMENT (OUTPATIENT)
Dept: PHYSICAL THERAPY | Age: 76
End: 2021-02-03

## 2021-02-08 ENCOUNTER — APPOINTMENT (OUTPATIENT)
Dept: PHYSICAL THERAPY | Age: 76
End: 2021-02-08

## 2021-02-10 ENCOUNTER — APPOINTMENT (OUTPATIENT)
Dept: PHYSICAL THERAPY | Age: 76
End: 2021-02-10

## 2021-02-15 ENCOUNTER — APPOINTMENT (OUTPATIENT)
Dept: PHYSICAL THERAPY | Age: 76
End: 2021-02-15

## 2021-02-17 ENCOUNTER — APPOINTMENT (OUTPATIENT)
Dept: PHYSICAL THERAPY | Age: 76
End: 2021-02-17

## 2021-02-19 NOTE — PROGRESS NOTES
I am accessing Ms. Owen's chart as a part of our department's internal chart auditing process. I certify that Ms. Deja Stephens is, or was, a patient in our department. Thank you, 
Nisha Martin, PT, DPT 
2/19/2021

## 2021-02-22 ENCOUNTER — APPOINTMENT (OUTPATIENT)
Dept: PHYSICAL THERAPY | Age: 76
End: 2021-02-22

## 2021-02-24 ENCOUNTER — APPOINTMENT (OUTPATIENT)
Dept: PHYSICAL THERAPY | Age: 76
End: 2021-02-24

## 2022-11-25 ENCOUNTER — TELEMEDICINE (OUTPATIENT)
Dept: FAMILY MEDICINE CLINIC | Facility: CLINIC | Age: 77
End: 2022-11-25
Payer: MEDICAID

## 2022-11-25 DIAGNOSIS — A63.0 GENITAL WARTS: ICD-10-CM

## 2022-11-25 DIAGNOSIS — F51.01 PRIMARY INSOMNIA: ICD-10-CM

## 2022-11-25 DIAGNOSIS — E03.9 ACQUIRED HYPOTHYROIDISM: ICD-10-CM

## 2022-11-25 DIAGNOSIS — I10 HTN (HYPERTENSION), BENIGN: Primary | ICD-10-CM

## 2022-11-25 DIAGNOSIS — E78.2 MIXED HYPERLIPIDEMIA: ICD-10-CM

## 2022-11-25 PROCEDURE — 99214 OFFICE O/P EST MOD 30 MIN: CPT | Performed by: FAMILY MEDICINE

## 2022-11-25 PROCEDURE — 1123F ACP DISCUSS/DSCN MKR DOCD: CPT | Performed by: FAMILY MEDICINE

## 2022-11-25 RX ORDER — LOSARTAN POTASSIUM 50 MG/1
50 TABLET ORAL DAILY
Qty: 90 TABLET | Refills: 1 | Status: SHIPPED | OUTPATIENT
Start: 2022-11-25

## 2022-11-25 RX ORDER — TRAZODONE HYDROCHLORIDE 50 MG/1
50 TABLET ORAL NIGHTLY
Qty: 90 TABLET | Refills: 1 | Status: SHIPPED | OUTPATIENT
Start: 2022-11-25

## 2022-11-25 RX ORDER — AMLODIPINE BESYLATE 10 MG/1
5-10 TABLET ORAL DAILY
Qty: 90 TABLET | Refills: 1 | Status: SHIPPED | OUTPATIENT
Start: 2022-11-25

## 2022-11-25 RX ORDER — HYDROCHLOROTHIAZIDE 25 MG/1
25 TABLET ORAL DAILY
Qty: 90 TABLET | Refills: 1 | Status: SHIPPED | OUTPATIENT
Start: 2022-11-25

## 2022-11-25 RX ORDER — NEBIVOLOL 5 MG/1
10 TABLET ORAL DAILY
Qty: 90 TABLET | Refills: 1 | Status: SHIPPED | OUTPATIENT
Start: 2022-11-25 | End: 2022-11-28 | Stop reason: DRUGHIGH

## 2022-11-25 RX ORDER — IMIQUIMOD 12.5 MG/.25G
CREAM TOPICAL
Qty: 24 EACH | Refills: 0 | Status: SHIPPED | OUTPATIENT
Start: 2022-11-25 | End: 2022-12-02

## 2022-11-25 ASSESSMENT — ENCOUNTER SYMPTOMS
CONSTIPATION: 0
SHORTNESS OF BREATH: 0
DIARRHEA: 0
ROS SKIN COMMENTS: GENITAL WARTS
COUGH: 0
NAUSEA: 0
VOMITING: 0
ABDOMINAL PAIN: 0

## 2022-11-25 NOTE — PROGRESS NOTES
Mejia Jaimes (:  1945) is a Established patient, here for evaluation of the following:    Assessment & Plan   Below is the assessment and plan developed based on review of pertinent history, physical exam, labs, studies, and medications. 1. HTN (hypertension), benign  -     hydroCHLOROthiazide (HYDRODIURIL) 25 MG tablet; Take 1 tablet by mouth daily, Disp-90 tablet, R-1Normal  -     amLODIPine (NORVASC) 10 MG tablet; Take 0.5-1 tablets by mouth daily, Disp-90 tablet, R-1Normal  -     nebivolol (BYSTOLIC) 5 MG tablet; Take 2 tablets by mouth daily, Disp-90 tablet, R-1Normal  -     losartan (COZAAR) 50 MG tablet; Take 1 tablet by mouth daily, Disp-90 tablet, R-1Normal  2. Primary insomnia  -     traZODone (DESYREL) 50 MG tablet; Take 1 tablet by mouth nightly, Disp-90 tablet, R-1Normal  3. Mixed hyperlipidemia  4. Acquired hypothyroidism  5. Genital warts  -     imiquimod (ALDARA) 5 % cream; Apply topically three times a week to affected area., Disp-24 each, R-0, Normal  Meds refilled. Aldara given for genital warts. No follow-ups on file. Subjective   66 y/o CF here for follow up of chronic problems. She has HTN and is on Amlodipine 10 mg daily, HCTZ 25 mg daily, Losartan 50 mg daily and Bystolic 5 mg daily. She needs refills on all of them. Her BP is controlled. She has hypothyroidism and sees endocrinology, Dr. Mayank Chang. She also has HLD and takes Crestor 5 mg daily. She has complaints of genital warts around her vagina. She has had the same boyfriend for 19 yrs and he doesn't have anything. She was  prior to that, and he she is on her with several women. Review of Systems   Constitutional:  Negative for chills, fatigue and fever. Respiratory:  Negative for cough and shortness of breath. Cardiovascular:  Negative for chest pain and palpitations. Gastrointestinal:  Negative for abdominal pain, constipation, diarrhea, nausea and vomiting.    Skin:         Genital warts Neurological:  Negative for dizziness and headaches. Psychiatric/Behavioral:  Negative for sleep disturbance. The patient is not nervous/anxious. Objective   Patient-Reported Vitals  No data recorded     Physical Exam  Constitutional:       Appearance: Normal appearance. HENT:      Head: Normocephalic. Neurological:      Mental Status: She is alert and oriented to person, place, and time.    Psychiatric:         Mood and Affect: Mood normal.     [INSTRUCTIONS:  \"[x]\" Indicates a positive item  \"[]\" Indicates a negative item  -- DELETE ALL ITEMS NOT EXAMINED]    Constitutional: [x] Appears well-developed and well-nourished [x] No apparent distress      [] Abnormal -     Mental status: [x] Alert and awake  [x] Oriented to person/place/time [x] Able to follow commands    [] Abnormal -     Eyes:   EOM    [x]  Normal    [] Abnormal -   Sclera  [x]  Normal    [] Abnormal -          Discharge [x]  None visible   [] Abnormal -     HENT: [x] Normocephalic, atraumatic  [] Abnormal -   [x] Mouth/Throat: Mucous membranes are moist    External Ears [x] Normal  [] Abnormal -    Neck: [x] No visualized mass [] Abnormal -     Pulmonary/Chest: [x] Respiratory effort normal   [x] No visualized signs of difficulty breathing or respiratory distress        [] Abnormal -      Musculoskeletal:   [x] Normal gait with no signs of ataxia         [x] Normal range of motion of neck        [] Abnormal -     Neurological:        [x] No Facial Asymmetry (Cranial nerve 7 motor function) (limited exam due to video visit)          [x] No gaze palsy        [] Abnormal -          Skin:        [x] No significant exanthematous lesions or discoloration noted on facial skin         [] Abnormal -            Psychiatric:       [x] Normal Affect [] Abnormal -        [x] No Hallucinations    Other pertinent observable physical exam findings:-         On this date 11/25/2022 I have spent 20 minutes reviewing previous notes, test results and face

## 2022-11-28 ENCOUNTER — TELEPHONE (OUTPATIENT)
Dept: FAMILY MEDICINE CLINIC | Facility: CLINIC | Age: 77
End: 2022-11-28

## 2022-11-28 DIAGNOSIS — I10 HTN (HYPERTENSION), BENIGN: ICD-10-CM

## 2022-11-28 RX ORDER — NEBIVOLOL 10 MG/1
10 TABLET ORAL DAILY
Qty: 90 TABLET | Refills: 1 | Status: SHIPPED | OUTPATIENT
Start: 2022-11-28

## 2022-11-28 NOTE — TELEPHONE ENCOUNTER
Called and states that Dr. Kori Montague called in her Bystolic on Friday. States that it was written differently than she normally takes this, and will be short if she takes it as written on the prescription. Wondering If this can be resent as she normally takes it, one 10mg daily? States prescription is written as 5 mg twice daily, but only written for 45 day supply. Please resend as one 10 mg once daily.

## 2023-05-25 ENCOUNTER — OFFICE VISIT (OUTPATIENT)
Dept: FAMILY MEDICINE CLINIC | Facility: CLINIC | Age: 78
End: 2023-05-25
Payer: MEDICAID

## 2023-05-25 VITALS
DIASTOLIC BLOOD PRESSURE: 72 MMHG | HEIGHT: 67 IN | OXYGEN SATURATION: 98 % | HEART RATE: 70 BPM | SYSTOLIC BLOOD PRESSURE: 130 MMHG | BODY MASS INDEX: 26.53 KG/M2 | RESPIRATION RATE: 16 BRPM | WEIGHT: 169 LBS

## 2023-05-25 DIAGNOSIS — F51.01 PRIMARY INSOMNIA: ICD-10-CM

## 2023-05-25 DIAGNOSIS — I10 HTN (HYPERTENSION), BENIGN: Primary | ICD-10-CM

## 2023-05-25 DIAGNOSIS — F41.9 ANXIETY: ICD-10-CM

## 2023-05-25 DIAGNOSIS — E03.9 ACQUIRED HYPOTHYROIDISM: ICD-10-CM

## 2023-05-25 DIAGNOSIS — E78.2 MIXED HYPERLIPIDEMIA: ICD-10-CM

## 2023-05-25 PROCEDURE — 3078F DIAST BP <80 MM HG: CPT | Performed by: FAMILY MEDICINE

## 2023-05-25 PROCEDURE — 3074F SYST BP LT 130 MM HG: CPT | Performed by: FAMILY MEDICINE

## 2023-05-25 PROCEDURE — 1123F ACP DISCUSS/DSCN MKR DOCD: CPT | Performed by: FAMILY MEDICINE

## 2023-05-25 PROCEDURE — 99214 OFFICE O/P EST MOD 30 MIN: CPT | Performed by: FAMILY MEDICINE

## 2023-05-25 RX ORDER — NEBIVOLOL 10 MG/1
10 TABLET ORAL DAILY
Qty: 90 TABLET | Refills: 1 | Status: SHIPPED | OUTPATIENT
Start: 2023-05-25

## 2023-05-25 RX ORDER — AMLODIPINE BESYLATE 10 MG/1
5-10 TABLET ORAL DAILY
Qty: 90 TABLET | Refills: 1 | Status: SHIPPED | OUTPATIENT
Start: 2023-05-25

## 2023-05-25 RX ORDER — LOSARTAN POTASSIUM 50 MG/1
50 TABLET ORAL DAILY
Qty: 90 TABLET | Refills: 1 | Status: SHIPPED | OUTPATIENT
Start: 2023-05-25

## 2023-05-25 RX ORDER — NICOTINE POLACRILEX 2 MG
GUM BUCCAL DAILY
COMMUNITY
Start: 2021-07-30

## 2023-05-25 RX ORDER — DICYCLOMINE HCL 20 MG
20 TABLET ORAL 2 TIMES DAILY
COMMUNITY
Start: 2022-03-24

## 2023-05-25 RX ORDER — FLUTICASONE PROPIONATE 50 MCG
SPRAY, SUSPENSION (ML) NASAL
COMMUNITY
Start: 2013-09-27

## 2023-05-25 RX ORDER — HYDROCHLOROTHIAZIDE 25 MG/1
25 TABLET ORAL DAILY
Qty: 90 TABLET | Refills: 1 | Status: SHIPPED | OUTPATIENT
Start: 2023-05-25

## 2023-05-25 RX ORDER — BUSPIRONE HYDROCHLORIDE 5 MG/1
5 TABLET ORAL 2 TIMES DAILY
Qty: 60 TABLET | Refills: 0 | Status: SHIPPED | OUTPATIENT
Start: 2023-05-25 | End: 2023-06-24

## 2023-05-25 RX ORDER — NIACIN 500 MG/1
500 TABLET, EXTENDED RELEASE ORAL DAILY
COMMUNITY
Start: 2021-07-30

## 2023-05-25 RX ORDER — BETA-CAROTENE 7500 MCG
CAPSULE ORAL
COMMUNITY
Start: 2021-07-30

## 2023-05-25 RX ORDER — UREA 10 %
LOTION (ML) TOPICAL DAILY
COMMUNITY
Start: 2021-07-30

## 2023-05-25 RX ORDER — TRAZODONE HYDROCHLORIDE 50 MG/1
50 TABLET ORAL NIGHTLY
Qty: 90 TABLET | Refills: 1 | Status: SHIPPED | OUTPATIENT
Start: 2023-05-25

## 2023-05-25 RX ORDER — CYCLOSPORINE 0.5 MG/ML
EMULSION OPHTHALMIC
COMMUNITY
Start: 2013-09-27

## 2023-05-25 SDOH — ECONOMIC STABILITY: FOOD INSECURITY: WITHIN THE PAST 12 MONTHS, THE FOOD YOU BOUGHT JUST DIDN'T LAST AND YOU DIDN'T HAVE MONEY TO GET MORE.: NEVER TRUE

## 2023-05-25 SDOH — ECONOMIC STABILITY: FOOD INSECURITY: WITHIN THE PAST 12 MONTHS, YOU WORRIED THAT YOUR FOOD WOULD RUN OUT BEFORE YOU GOT MONEY TO BUY MORE.: NEVER TRUE

## 2023-05-25 SDOH — ECONOMIC STABILITY: INCOME INSECURITY: HOW HARD IS IT FOR YOU TO PAY FOR THE VERY BASICS LIKE FOOD, HOUSING, MEDICAL CARE, AND HEATING?: NOT HARD AT ALL

## 2023-05-25 SDOH — ECONOMIC STABILITY: HOUSING INSECURITY
IN THE LAST 12 MONTHS, WAS THERE A TIME WHEN YOU DID NOT HAVE A STEADY PLACE TO SLEEP OR SLEPT IN A SHELTER (INCLUDING NOW)?: NO

## 2023-05-25 ASSESSMENT — ENCOUNTER SYMPTOMS
SHORTNESS OF BREATH: 0
VOMITING: 0
ABDOMINAL PAIN: 0
CONSTIPATION: 0
NAUSEA: 0
COUGH: 0
DIARRHEA: 0

## 2023-05-25 ASSESSMENT — PATIENT HEALTH QUESTIONNAIRE - PHQ9
SUM OF ALL RESPONSES TO PHQ QUESTIONS 1-9: 0
SUM OF ALL RESPONSES TO PHQ QUESTIONS 1-9: 0
1. LITTLE INTEREST OR PLEASURE IN DOING THINGS: 0
2. FEELING DOWN, DEPRESSED OR HOPELESS: 0
SUM OF ALL RESPONSES TO PHQ QUESTIONS 1-9: 0
SUM OF ALL RESPONSES TO PHQ9 QUESTIONS 1 & 2: 0
SUM OF ALL RESPONSES TO PHQ QUESTIONS 1-9: 0

## 2023-06-20 ENCOUNTER — OFFICE VISIT (OUTPATIENT)
Dept: FAMILY MEDICINE CLINIC | Facility: CLINIC | Age: 78
End: 2023-06-20
Payer: MEDICAID

## 2023-06-20 VITALS
RESPIRATION RATE: 16 BRPM | SYSTOLIC BLOOD PRESSURE: 118 MMHG | DIASTOLIC BLOOD PRESSURE: 78 MMHG | HEIGHT: 67 IN | WEIGHT: 168.2 LBS | BODY MASS INDEX: 26.4 KG/M2

## 2023-06-20 DIAGNOSIS — F41.9 ANXIETY: Primary | ICD-10-CM

## 2023-06-20 PROBLEM — F41.1 ANXIETY STATE: Status: ACTIVE | Noted: 2023-06-20

## 2023-06-20 PROBLEM — I71.40 AAA (ABDOMINAL AORTIC ANEURYSM) (HCC): Status: ACTIVE | Noted: 2022-04-13

## 2023-06-20 PROBLEM — N39.0 ACUTE UTI: Status: ACTIVE | Noted: 2023-06-20

## 2023-06-20 PROBLEM — E03.9 HYPOTHYROIDISM: Status: ACTIVE | Noted: 2021-07-30

## 2023-06-20 PROBLEM — J30.89 ENVIRONMENTAL AND SEASONAL ALLERGIES: Status: ACTIVE | Noted: 2021-07-30

## 2023-06-20 PROBLEM — R03.0 ELEVATED BLOOD PRESSURE READING: Status: ACTIVE | Noted: 2023-01-24

## 2023-06-20 PROBLEM — J01.00 ACUTE MAXILLARY SINUSITIS: Status: ACTIVE | Noted: 2023-01-24

## 2023-06-20 PROBLEM — F41.0 PANIC DISORDER WITHOUT AGORAPHOBIA: Status: ACTIVE | Noted: 2023-06-20

## 2023-06-20 PROBLEM — E78.00 HIGH CHOLESTEROL: Status: ACTIVE | Noted: 2023-06-20

## 2023-06-20 PROBLEM — F51.01 PRIMARY INSOMNIA: Status: ACTIVE | Noted: 2020-10-20

## 2023-06-20 PROCEDURE — 3074F SYST BP LT 130 MM HG: CPT | Performed by: FAMILY MEDICINE

## 2023-06-20 PROCEDURE — 3078F DIAST BP <80 MM HG: CPT | Performed by: FAMILY MEDICINE

## 2023-06-20 PROCEDURE — 99213 OFFICE O/P EST LOW 20 MIN: CPT | Performed by: FAMILY MEDICINE

## 2023-06-20 PROCEDURE — 1123F ACP DISCUSS/DSCN MKR DOCD: CPT | Performed by: FAMILY MEDICINE

## 2023-06-20 ASSESSMENT — ENCOUNTER SYMPTOMS
COUGH: 0
VOMITING: 0
SHORTNESS OF BREATH: 0
CONSTIPATION: 0
DIARRHEA: 0
NAUSEA: 0
ABDOMINAL PAIN: 0

## 2023-06-20 ASSESSMENT — PATIENT HEALTH QUESTIONNAIRE - PHQ9
1. LITTLE INTEREST OR PLEASURE IN DOING THINGS: 0
SUM OF ALL RESPONSES TO PHQ9 QUESTIONS 1 & 2: 0
SUM OF ALL RESPONSES TO PHQ QUESTIONS 1-9: 0
2. FEELING DOWN, DEPRESSED OR HOPELESS: 0
SUM OF ALL RESPONSES TO PHQ QUESTIONS 1-9: 0

## 2023-06-20 NOTE — PROGRESS NOTES
PROGRESS NOTE    SUBJECTIVE:   Xochitl Balderrama is a 65 y.o. female seen for a follow up visit regarding [unfilled]    66 y/o CF here for follow up of anxiety. I started her on Buspar on last OV and it has been doing well. She has been taking one tablet, but it has been wearing off in the evening. She thinks she will increase it to BID though. Past Medical History, Past Surgical History, Family history, Social History, and Medications were all reviewed with the patient today and updated as necessary.        Current Outpatient Medications   Medication Sig Dispense Refill    Ascorbic Acid (VITAMIN C CR) 1000 MG TBCR Take by mouth daily      beta carotene 27734 units capsule Take by mouth      Biotin 1 MG CAPS Take by mouth daily      Cholecalciferol 50 MCG (2000 UT) CAPS Take 2,000 Units by mouth      fluticasone (FLONASE) 50 MCG/ACT nasal spray   0 Refill(s), Type: Maintenance      folic acid (FOLVITE) 984 MCG tablet Take by mouth daily      Magnesium Oxide (MAGNESIUM-OXIDE) 250 MG TABS tablet Take 1 tablet by mouth      niacin (NIASPAN) 500 MG extended release tablet Take 1 tablet by mouth daily      amLODIPine (NORVASC) 10 MG tablet Take 0.5-1 tablets by mouth daily 90 tablet 1    nebivolol (BYSTOLIC) 10 MG tablet Take 1 tablet by mouth daily 90 tablet 1    hydroCHLOROthiazide (HYDRODIURIL) 25 MG tablet Take 1 tablet by mouth daily 90 tablet 1    losartan (COZAAR) 50 MG tablet Take 1 tablet by mouth daily 90 tablet 1    traZODone (DESYREL) 50 MG tablet Take 1 tablet by mouth nightly 90 tablet 1    busPIRone (BUSPAR) 5 MG tablet Take 1 tablet by mouth 2 times daily 60 tablet 0    levothyroxine (SYNTHROID) 88 MCG tablet Take 1 tablet by mouth every morning (before breakfast)      rosuvastatin (CRESTOR) 5 MG tablet Take 2 tablets by mouth      cycloSPORINE (RESTASIS) 0.05 % ophthalmic emulsion   1 drop(s), Both eyes, q12hr, 0 Refill(s), Type: Maintenance (Patient not taking: Reported on 6/20/2023)      dicyclomine

## 2023-06-21 DIAGNOSIS — F41.9 ANXIETY: ICD-10-CM

## 2023-06-21 NOTE — TELEPHONE ENCOUNTER
Prescription pended and ready for approval. Sending to Dr. Anna Cooper. Please advise. within normal limits

## 2023-06-23 RX ORDER — BUSPIRONE HYDROCHLORIDE 5 MG/1
5 TABLET ORAL 2 TIMES DAILY
Qty: 60 TABLET | Refills: 2 | Status: SHIPPED | OUTPATIENT
Start: 2023-06-23 | End: 2023-09-21

## 2023-07-10 ENCOUNTER — TELEPHONE (OUTPATIENT)
Dept: FAMILY MEDICINE CLINIC | Facility: CLINIC | Age: 78
End: 2023-07-10

## 2023-07-10 NOTE — TELEPHONE ENCOUNTER
Healthcare etc. Called to check if we received fax for re certification of incontinence supplies. I advised that I did receive and am awaiting Dr. Heather Hanks signature so that I can fax back. She voiced understanding and thanks.

## 2023-07-14 PROBLEM — N39.3 STRESS INCONTINENCE OF URINE: Status: ACTIVE | Noted: 2023-07-14

## 2023-08-30 ENCOUNTER — TELEMEDICINE (OUTPATIENT)
Dept: FAMILY MEDICINE CLINIC | Facility: CLINIC | Age: 78
End: 2023-08-30
Payer: MEDICAID

## 2023-08-30 DIAGNOSIS — U09.9 POST-COVID SYNDROME: Primary | ICD-10-CM

## 2023-08-30 PROCEDURE — 99213 OFFICE O/P EST LOW 20 MIN: CPT | Performed by: FAMILY MEDICINE

## 2023-08-30 PROCEDURE — 1123F ACP DISCUSS/DSCN MKR DOCD: CPT | Performed by: FAMILY MEDICINE

## 2023-08-30 RX ORDER — MECLIZINE HYDROCHLORIDE 25 MG/1
25 TABLET ORAL 3 TIMES DAILY PRN
Qty: 30 TABLET | Refills: 0 | Status: SHIPPED | OUTPATIENT
Start: 2023-08-30 | End: 2023-09-09

## 2023-08-30 RX ORDER — FLUTICASONE PROPIONATE 50 MCG
1 SPRAY, SUSPENSION (ML) NASAL DAILY
Qty: 32 G | Refills: 1 | Status: SHIPPED | OUTPATIENT
Start: 2023-08-30

## 2023-08-30 RX ORDER — MONTELUKAST SODIUM 10 MG/1
10 TABLET ORAL DAILY
Qty: 30 TABLET | Refills: 2 | Status: SHIPPED | OUTPATIENT
Start: 2023-08-30

## 2023-08-30 ASSESSMENT — PATIENT HEALTH QUESTIONNAIRE - PHQ9
SUM OF ALL RESPONSES TO PHQ QUESTIONS 1-9: 0
2. FEELING DOWN, DEPRESSED OR HOPELESS: 0
SUM OF ALL RESPONSES TO PHQ9 QUESTIONS 1 & 2: 0
SUM OF ALL RESPONSES TO PHQ QUESTIONS 1-9: 0
SUM OF ALL RESPONSES TO PHQ QUESTIONS 1-9: 0
1. LITTLE INTEREST OR PLEASURE IN DOING THINGS: 0
SUM OF ALL RESPONSES TO PHQ QUESTIONS 1-9: 0

## 2023-08-30 ASSESSMENT — ENCOUNTER SYMPTOMS
COUGH: 0
NAUSEA: 0
ABDOMINAL PAIN: 0
CONSTIPATION: 0
VOMITING: 0
SHORTNESS OF BREATH: 0
DIARRHEA: 0

## 2023-08-30 NOTE — PROGRESS NOTES
Kathleen Weinberg, was evaluated through a synchronous (real-time) audio-video encounter. The patient (or guardian if applicable) is aware that this is a billable service, which includes applicable co-pays. This Virtual Visit was conducted with patient's (and/or legal guardian's) consent. Patient identification was verified, and a caregiver was present when appropriate. The patient was located at Home: [address] [place] [phone]  Provider was located at Home ([address] [place] [address]): Sabiha Marlow (:  1945) is a Established patient, presenting virtually for evaluation of the following:    Assessment & Plan   Below is the assessment and plan developed based on review of pertinent history, physical exam, labs, studies, and medications. 1. Post-COVID syndrome  -     meclizine (ANTIVERT) 25 MG tablet; Take 1 tablet by mouth 3 times daily as needed for Dizziness, Disp-30 tablet, R-0Normal  -     montelukast (SINGULAIR) 10 MG tablet; Take 1 tablet by mouth daily, Disp-30 tablet, R-2Normal  -     fluticasone (FLONASE) 50 MCG/ACT nasal spray; 1 spray by Each Nostril route daily, Disp-32 g, R-1Normal  Meclizine given for dizziness. Singulair and Flonase given for congestion. No follow-ups on file. Subjective   39-year-old  female here for post COVID congestion and dizziness. Patient had COVID 4 weeks ago. She still has some brain fog and fatigue, but in the dizziness and congestion are driving her crazy. She has tried over-the-counter Flonase and allergy medicine, which did not help. She is wanting to know if there is anything prescription wise that will help with the congestion and dizziness. Review of Systems   Constitutional:  Positive for fatigue. Negative for chills and fever. HENT:  Positive for congestion and postnasal drip. Respiratory:  Negative for cough and shortness of breath. Cardiovascular:  Negative for chest pain and palpitations.    Gastrointestinal:

## 2024-01-03 ENCOUNTER — NURSE ONLY (OUTPATIENT)
Dept: FAMILY MEDICINE CLINIC | Facility: CLINIC | Age: 79
End: 2024-01-03
Payer: MEDICAID

## 2024-01-03 ENCOUNTER — TELEPHONE (OUTPATIENT)
Dept: FAMILY MEDICINE CLINIC | Facility: CLINIC | Age: 79
End: 2024-01-03

## 2024-01-03 DIAGNOSIS — R30.0 DYSURIA: Primary | ICD-10-CM

## 2024-01-03 LAB
BILIRUBIN, URINE, POC: NEGATIVE
BLOOD URINE, POC: NEGATIVE
GLUCOSE URINE, POC: NEGATIVE
KETONES, URINE, POC: NORMAL
LEUKOCYTE ESTERASE, URINE, POC: NEGATIVE
NITRITE, URINE, POC: NEGATIVE
PH, URINE, POC: 6.5 (ref 4.6–8)
PROTEIN,URINE, POC: 30
SPECIFIC GRAVITY, URINE, POC: 1.02 (ref 1–1.03)
URINALYSIS CLARITY, POC: CLEAR
URINALYSIS COLOR, POC: YELLOW
UROBILINOGEN, POC: NORMAL

## 2024-01-03 PROCEDURE — 81002 URINALYSIS NONAUTO W/O SCOPE: CPT | Performed by: FAMILY MEDICINE

## 2024-01-03 NOTE — TELEPHONE ENCOUNTER
Called patient, no answer, left message via voice mail, urinalysis was negative for any  infection and to call office to schedule appointment with DR Navarrete if symptoms do not improve or worsens

## 2024-01-03 NOTE — TELEPHONE ENCOUNTER
Patient called, spoke with patient, stated feels she has urinary infection, symptoms of pain and burning when urinating ,also  with back pain, no fever, symptoms time 3 days, understands Dr Navarrete not in office, requesting to come and give urine sample as she does not want to go to Urgent Care  Please advise

## 2024-01-03 NOTE — TELEPHONE ENCOUNTER
Called patient to inform ok to come and leave urine specimen, no answer,left message via voice mail to call office if she can come in for nurse visit today or tomorrow

## 2024-05-15 DIAGNOSIS — U09.9 POST-COVID SYNDROME: ICD-10-CM

## 2024-05-15 DIAGNOSIS — F51.01 PRIMARY INSOMNIA: ICD-10-CM

## 2024-05-17 DIAGNOSIS — U09.9 POST-COVID SYNDROME: ICD-10-CM

## 2024-05-17 RX ORDER — FLUTICASONE PROPIONATE 50 MCG
SPRAY, SUSPENSION (ML) NASAL
Qty: 32 G | Refills: 1 | Status: SHIPPED | OUTPATIENT
Start: 2024-05-17

## 2024-05-19 RX ORDER — FLUTICASONE PROPIONATE 50 MCG
1 SPRAY, SUSPENSION (ML) NASAL DAILY
Qty: 32 G | Refills: 1 | OUTPATIENT
Start: 2024-05-19

## 2024-05-19 RX ORDER — TRAZODONE HYDROCHLORIDE 100 MG/1
100 TABLET ORAL NIGHTLY
Qty: 90 TABLET | Refills: 1 | Status: SHIPPED | OUTPATIENT
Start: 2024-05-19 | End: 2024-06-24 | Stop reason: SDUPTHER

## 2024-06-24 ENCOUNTER — OFFICE VISIT (OUTPATIENT)
Dept: FAMILY MEDICINE CLINIC | Facility: CLINIC | Age: 79
End: 2024-06-24
Payer: MEDICAID

## 2024-06-24 VITALS
WEIGHT: 161.4 LBS | BODY MASS INDEX: 25.33 KG/M2 | SYSTOLIC BLOOD PRESSURE: 118 MMHG | DIASTOLIC BLOOD PRESSURE: 78 MMHG | HEIGHT: 67 IN | RESPIRATION RATE: 16 BRPM

## 2024-06-24 DIAGNOSIS — E78.2 MIXED HYPERLIPIDEMIA: ICD-10-CM

## 2024-06-24 DIAGNOSIS — I10 HTN (HYPERTENSION), BENIGN: ICD-10-CM

## 2024-06-24 DIAGNOSIS — F41.1 ANXIETY STATE: Primary | ICD-10-CM

## 2024-06-24 DIAGNOSIS — F51.01 PRIMARY INSOMNIA: ICD-10-CM

## 2024-06-24 DIAGNOSIS — R30.0 DYSURIA: ICD-10-CM

## 2024-06-24 DIAGNOSIS — J30.89 ENVIRONMENTAL AND SEASONAL ALLERGIES: ICD-10-CM

## 2024-06-24 LAB
ALBUMIN SERPL-MCNC: 4 G/DL (ref 3.2–4.6)
ALBUMIN/GLOB SERPL: 1.4 (ref 1–1.9)
ALP SERPL-CCNC: 89 U/L (ref 35–104)
ALT SERPL-CCNC: 15 U/L (ref 12–65)
ANION GAP SERPL CALC-SCNC: 11 MMOL/L (ref 9–18)
AST SERPL-CCNC: 21 U/L (ref 15–37)
BILIRUB SERPL-MCNC: 0.2 MG/DL (ref 0–1.2)
BUN SERPL-MCNC: 12 MG/DL (ref 8–23)
CALCIUM SERPL-MCNC: 9.8 MG/DL (ref 8.8–10.2)
CHLORIDE SERPL-SCNC: 100 MMOL/L (ref 98–107)
CHOLEST SERPL-MCNC: 317 MG/DL (ref 0–200)
CO2 SERPL-SCNC: 25 MMOL/L (ref 20–28)
CREAT SERPL-MCNC: 0.91 MG/DL (ref 0.6–1.1)
GLOBULIN SER CALC-MCNC: 2.9 G/DL (ref 2.3–3.5)
GLUCOSE SERPL-MCNC: 99 MG/DL (ref 70–99)
HDLC SERPL-MCNC: 77 MG/DL (ref 40–60)
HDLC SERPL: 4.1 (ref 0–5)
LDLC SERPL CALC-MCNC: 216 MG/DL (ref 0–100)
POTASSIUM SERPL-SCNC: 3.8 MMOL/L (ref 3.5–5.1)
PROT SERPL-MCNC: 6.9 G/DL (ref 6.3–8.2)
SODIUM SERPL-SCNC: 137 MMOL/L (ref 136–145)
TRIGL SERPL-MCNC: 121 MG/DL (ref 0–150)
VLDLC SERPL CALC-MCNC: 24 MG/DL (ref 6–23)

## 2024-06-24 PROCEDURE — 99214 OFFICE O/P EST MOD 30 MIN: CPT | Performed by: FAMILY MEDICINE

## 2024-06-24 PROCEDURE — 3074F SYST BP LT 130 MM HG: CPT | Performed by: FAMILY MEDICINE

## 2024-06-24 PROCEDURE — 1123F ACP DISCUSS/DSCN MKR DOCD: CPT | Performed by: FAMILY MEDICINE

## 2024-06-24 PROCEDURE — 3078F DIAST BP <80 MM HG: CPT | Performed by: FAMILY MEDICINE

## 2024-06-24 PROCEDURE — 81003 URINALYSIS AUTO W/O SCOPE: CPT | Performed by: FAMILY MEDICINE

## 2024-06-24 RX ORDER — HYDROXYZINE HYDROCHLORIDE 25 MG/1
25 TABLET, FILM COATED ORAL 3 TIMES DAILY PRN
Status: CANCELLED | OUTPATIENT
Start: 2024-06-24

## 2024-06-24 RX ORDER — BUSPIRONE HYDROCHLORIDE 5 MG/1
5 TABLET ORAL 2 TIMES DAILY
Qty: 60 TABLET | Refills: 1 | Status: SHIPPED | OUTPATIENT
Start: 2024-06-24 | End: 2024-08-23

## 2024-06-24 RX ORDER — NEBIVOLOL 10 MG/1
10 TABLET ORAL DAILY
Qty: 90 TABLET | Refills: 1 | Status: SHIPPED | OUTPATIENT
Start: 2024-06-24

## 2024-06-24 RX ORDER — HYDROXYZINE HYDROCHLORIDE 25 MG/1
25 TABLET, FILM COATED ORAL 3 TIMES DAILY PRN
COMMUNITY

## 2024-06-24 RX ORDER — TRAZODONE HYDROCHLORIDE 100 MG/1
100 TABLET ORAL NIGHTLY
Qty: 90 TABLET | Refills: 1 | Status: SHIPPED | OUTPATIENT
Start: 2024-06-24

## 2024-06-24 RX ORDER — FLUTICASONE PROPIONATE 50 MCG
SPRAY, SUSPENSION (ML) NASAL
Qty: 32 G | Refills: 1 | Status: SHIPPED | OUTPATIENT
Start: 2024-06-24

## 2024-06-24 RX ORDER — AMLODIPINE BESYLATE 10 MG/1
5-10 TABLET ORAL DAILY
Qty: 90 TABLET | Refills: 1 | Status: SHIPPED | OUTPATIENT
Start: 2024-06-24

## 2024-06-24 RX ORDER — FAMOTIDINE 40 MG/1
40 TABLET, FILM COATED ORAL EVERY EVENING
Qty: 30 TABLET | Refills: 1 | Status: SHIPPED | OUTPATIENT
Start: 2024-06-24

## 2024-06-24 RX ORDER — LOSARTAN POTASSIUM 50 MG/1
50 TABLET ORAL DAILY
Qty: 90 TABLET | Refills: 1 | Status: SHIPPED | OUTPATIENT
Start: 2024-06-24

## 2024-06-24 RX ORDER — HYDROCHLOROTHIAZIDE 25 MG/1
25 TABLET ORAL DAILY
Qty: 90 TABLET | Refills: 1 | Status: SHIPPED | OUTPATIENT
Start: 2024-06-24

## 2024-06-24 SDOH — ECONOMIC STABILITY: FOOD INSECURITY: WITHIN THE PAST 12 MONTHS, THE FOOD YOU BOUGHT JUST DIDN'T LAST AND YOU DIDN'T HAVE MONEY TO GET MORE.: NEVER TRUE

## 2024-06-24 SDOH — ECONOMIC STABILITY: FOOD INSECURITY: WITHIN THE PAST 12 MONTHS, YOU WORRIED THAT YOUR FOOD WOULD RUN OUT BEFORE YOU GOT MONEY TO BUY MORE.: NEVER TRUE

## 2024-06-24 SDOH — ECONOMIC STABILITY: INCOME INSECURITY: HOW HARD IS IT FOR YOU TO PAY FOR THE VERY BASICS LIKE FOOD, HOUSING, MEDICAL CARE, AND HEATING?: NOT HARD AT ALL

## 2024-06-24 ASSESSMENT — ENCOUNTER SYMPTOMS
COUGH: 0
ABDOMINAL PAIN: 0
BACK PAIN: 1
NAUSEA: 0
SHORTNESS OF BREATH: 0
DIARRHEA: 0
VOMITING: 0
CONSTIPATION: 0

## 2024-06-24 ASSESSMENT — PATIENT HEALTH QUESTIONNAIRE - PHQ9
SUM OF ALL RESPONSES TO PHQ QUESTIONS 1-9: 1
2. FEELING DOWN, DEPRESSED OR HOPELESS: SEVERAL DAYS
SUM OF ALL RESPONSES TO PHQ QUESTIONS 1-9: 1
SUM OF ALL RESPONSES TO PHQ QUESTIONS 1-9: 1
1. LITTLE INTEREST OR PLEASURE IN DOING THINGS: NOT AT ALL
SUM OF ALL RESPONSES TO PHQ QUESTIONS 1-9: 1
SUM OF ALL RESPONSES TO PHQ9 QUESTIONS 1 & 2: 1

## 2024-06-24 NOTE — PROGRESS NOTES
Negative for chills, fatigue and fever.   Respiratory:  Negative for cough and shortness of breath.    Cardiovascular:  Negative for chest pain and palpitations.   Gastrointestinal:  Negative for abdominal pain, constipation, diarrhea, nausea and vomiting.   Genitourinary:  Positive for dysuria and frequency.   Musculoskeletal:  Positive for arthralgias and back pain.   Neurological:  Negative for dizziness and headaches.   Psychiatric/Behavioral:  Negative for sleep disturbance. The patient is not nervous/anxious.           OBJECTIVE:  Vitals:    06/24/24 1321   BP: 118/78   Resp: 16   Weight: 73.2 kg (161 lb 6.4 oz)   Height: 1.702 m (5' 7\")        Physical Exam  Constitutional:       Appearance: Normal appearance.   HENT:      Head: Normocephalic.   Neck:      Vascular: No carotid bruit.   Cardiovascular:      Rate and Rhythm: Normal rate and regular rhythm.      Heart sounds: Normal heart sounds.   Pulmonary:      Effort: Pulmonary effort is normal.      Breath sounds: Normal breath sounds.   Musculoskeletal:      Right lower leg: No edema.      Left lower leg: No edema.   Neurological:      Mental Status: She is alert and oriented to person, place, and time.   Psychiatric:         Mood and Affect: Mood normal.        Medical problems and test results were reviewed with the patient today.     No results found for this or any previous visit (from the past 672 hour(s)).      ASSESSMENT and PLAN    Alana was seen today for follow-up and other.    Diagnoses and all orders for this visit:    Anxiety state  -     busPIRone (BUSPAR) 5 MG tablet; Take 1 tablet by mouth 2 times daily    HTN (hypertension), benign  -     nebivolol (BYSTOLIC) 10 MG tablet; Take 1 tablet by mouth daily  -     losartan (COZAAR) 50 MG tablet; Take 1 tablet by mouth daily  -     hydroCHLOROthiazide (HYDRODIURIL) 25 MG tablet; Take 1 tablet by mouth daily  -     amLODIPine (NORVASC) 10 MG tablet; Take 0.5-1 tablets by mouth daily  -

## 2024-06-25 LAB
BASOPHILS # BLD: 0 K/UL (ref 0–0.2)
BASOPHILS NFR BLD: 0 % (ref 0–2)
BILIRUBIN, URINE, POC: NEGATIVE
BLOOD URINE, POC: NEGATIVE
DIFFERENTIAL METHOD BLD: ABNORMAL
EOSINOPHIL # BLD: 0 K/UL (ref 0–0.8)
EOSINOPHIL NFR BLD: 0 % (ref 0.5–7.8)
ERYTHROCYTE [DISTWIDTH] IN BLOOD BY AUTOMATED COUNT: 13.2 % (ref 11.9–14.6)
GLUCOSE URINE, POC: NEGATIVE
HCT VFR BLD AUTO: 38.4 % (ref 35.8–46.3)
HGB BLD-MCNC: 13.4 G/DL (ref 11.7–15.4)
IMM GRANULOCYTES # BLD AUTO: 0.1 K/UL (ref 0–0.5)
IMM GRANULOCYTES NFR BLD AUTO: 1 % (ref 0–5)
KETONES, URINE, POC: ABNORMAL
LEUKOCYTE ESTERASE, URINE, POC: NEGATIVE
LYMPHOCYTES # BLD: 1.7 K/UL (ref 0.5–4.6)
LYMPHOCYTES NFR BLD: 25 % (ref 13–44)
MCH RBC QN AUTO: 35.2 PG (ref 26.1–32.9)
MCHC RBC AUTO-ENTMCNC: 34.9 G/DL (ref 31.4–35)
MCV RBC AUTO: 100.8 FL (ref 82–102)
MONOCYTES # BLD: 0.5 K/UL (ref 0.1–1.3)
MONOCYTES NFR BLD: 8 % (ref 4–12)
NEUTS SEG # BLD: 4.5 K/UL (ref 1.7–8.2)
NEUTS SEG NFR BLD: 66 % (ref 43–78)
NITRITE, URINE, POC: NEGATIVE
NRBC # BLD: 0 K/UL (ref 0–0.2)
PH, URINE, POC: 5.5 (ref 4.6–8)
PLATELET # BLD AUTO: 297 K/UL (ref 150–450)
PMV BLD AUTO: 9.2 FL (ref 9.4–12.3)
PROTEIN,URINE, POC: 30
RBC # BLD AUTO: 3.81 M/UL (ref 4.05–5.2)
SPECIFIC GRAVITY, URINE, POC: 1.02 (ref 1–1.03)
URINALYSIS CLARITY, POC: ABNORMAL
URINALYSIS COLOR, POC: YELLOW
UROBILINOGEN, POC: ABNORMAL
WBC # BLD AUTO: 6.8 K/UL (ref 4.3–11.1)

## 2024-06-27 ENCOUNTER — OFFICE VISIT (OUTPATIENT)
Dept: ORTHOPEDIC SURGERY | Age: 79
End: 2024-06-27
Payer: MEDICAID

## 2024-06-27 VITALS — BODY MASS INDEX: 25.27 KG/M2 | WEIGHT: 161 LBS | HEIGHT: 67 IN

## 2024-06-27 DIAGNOSIS — M54.16 LUMBAR RADICULOPATHY: ICD-10-CM

## 2024-06-27 DIAGNOSIS — M43.16 SPONDYLOLISTHESIS OF LUMBAR REGION: ICD-10-CM

## 2024-06-27 DIAGNOSIS — M47.816 LUMBAR SPONDYLOSIS: Primary | ICD-10-CM

## 2024-06-27 PROCEDURE — 1123F ACP DISCUSS/DSCN MKR DOCD: CPT | Performed by: PHYSICIAN ASSISTANT

## 2024-06-27 PROCEDURE — 99204 OFFICE O/P NEW MOD 45 MIN: CPT | Performed by: PHYSICIAN ASSISTANT

## 2024-06-27 RX ORDER — GABAPENTIN 300 MG/1
300 CAPSULE ORAL 3 TIMES DAILY
Qty: 90 CAPSULE | Refills: 2 | Status: SHIPPED | OUTPATIENT
Start: 2024-06-27 | End: 2024-09-25

## 2024-06-27 RX ORDER — METHYLPREDNISOLONE 4 MG/1
TABLET ORAL
Qty: 1 KIT | Refills: 0 | Status: SHIPPED | OUTPATIENT
Start: 2024-06-27

## 2024-06-27 NOTE — PROGRESS NOTES
Name: Alana Gutiérrez  YOB: 1945  Gender: female  MRN: [id number]    CC:   Chief Complaint   Patient presents with    New Patient     Low back pain          HPI:   Alana Gutiérrez is a 78 y.o. female with a PMHx of comorbidities below.      They present here for evaluation of back pain rating to the bilateral legs.  This been going on more than a year.  It has gradually progressed and gotten worse.  She has been taking Tylenol as needed.  She is having pain with just basic activities such as cleaning and maintaining her home.  Difficulty bending and stooping.  She has rating pain down both legs at times.  Denies any injury trauma or fall at the onset.  She is increasingly relying on a rolling walker.    Pain Scale: Up to a 10 out of 10  ADL's affected: Cleaning maintaining her home, getting dressed  Conservative treatment attempted: Tylenol          Past Medical History Includes:   Past Medical History:   Diagnosis Date    Endocrine disease     hypothyroidism    Hypercholesterolemia     Hypertension     medication    Other ill-defined conditions(509.89)     Hyperlipademia    Psychiatric disorder     anxiety/ panic attacks    Thyroid disease     thyroidectomy   ,   Past Surgical History:   Procedure Laterality Date    CHOLECYSTECTOMY      HYSTERECTOMY (CERVIX STATUS UNKNOWN)      PARTIAL HYSTERECTOMY (CERVIX NOT REMOVED)      THYROIDECTOMY       Family History:   Family History   Problem Relation Age of Onset    Cancer Brother     Stroke Brother     Hypertension Brother     Heart Disease Brother       Social History:   Social History     Tobacco Use    Smoking status: Never     Passive exposure: Never    Smokeless tobacco: Never   Substance Use Topics    Alcohol use: No       ALLERGIES:   Allergies   Allergen Reactions    Latex Itching and Rash    Fluoxetine Diarrhea    Phenazopyridine Itching    Sulfa Antibiotics Itching     Nervous and shakey    Fenofibrate Nausea And Vomiting        Patient Medications

## 2024-06-28 ENCOUNTER — TELEPHONE (OUTPATIENT)
Dept: ORTHOPEDIC SURGERY | Age: 79
End: 2024-06-28

## 2024-06-28 DIAGNOSIS — M54.16 LUMBAR RADICULOPATHY: ICD-10-CM

## 2024-06-28 DIAGNOSIS — M47.816 LUMBAR SPONDYLOSIS: Primary | ICD-10-CM

## 2024-06-28 DIAGNOSIS — M43.16 SPONDYLOLISTHESIS OF LUMBAR REGION: ICD-10-CM

## 2024-07-06 RX ORDER — ROSUVASTATIN CALCIUM 40 MG/1
40 TABLET, COATED ORAL DAILY
Qty: 90 TABLET | Refills: 1 | Status: SHIPPED | OUTPATIENT
Start: 2024-07-06

## 2024-07-15 ENCOUNTER — HOSPITAL ENCOUNTER (OUTPATIENT)
Dept: PHYSICAL THERAPY | Age: 79
Setting detail: RECURRING SERIES
Discharge: HOME | End: 2024-07-18
Payer: MEDICARE

## 2024-07-15 DIAGNOSIS — M79.605 PAIN IN LEFT LEG: ICD-10-CM

## 2024-07-15 DIAGNOSIS — M62.838 OTHER MUSCLE SPASM: ICD-10-CM

## 2024-07-15 DIAGNOSIS — M25.552 PAIN IN LEFT HIP: ICD-10-CM

## 2024-07-15 DIAGNOSIS — M62.830 MUSCLE SPASM OF BACK: ICD-10-CM

## 2024-07-15 DIAGNOSIS — M25.551 PAIN IN RIGHT HIP: ICD-10-CM

## 2024-07-15 DIAGNOSIS — M54.51 VERTEBROGENIC LOW BACK PAIN: Primary | ICD-10-CM

## 2024-07-15 PROCEDURE — 97110 THERAPEUTIC EXERCISES: CPT

## 2024-07-15 PROCEDURE — 97162 PT EVAL MOD COMPLEX 30 MIN: CPT

## 2024-07-15 NOTE — PROGRESS NOTES
Alana AUSTIN Marla  : 1945  Primary: [facility] (Medicare Managed)  Secondary: [facility] MEDICAID [facility] [place] [address] [address] [facility] [id number]  Phone: [phone]  Fax: [phone] Plan Frequency: 1-2 times per week    Plan of Care/Certification Expiration Date: 10/14/24        Plan of Care/Certification Expiration Date:  Plan of Care/Certification Expiration Date: 10/14/24    Frequency/Duration: Plan Frequency: 1-2 times per week      Time In/Out:   Time In: 930  Time Out: 1015      PT Visit Info:         Visit Count:  1    OUTPATIENT PHYSICAL THERAPY:   Treatment Note 7/15/2024       Episode  (low back pain rehab)               Treatment Diagnosis:    Vertebrogenic low back pain  Pain in right hip  Pain in left hip  Pain in left leg  Muscle spasm of back  Other muscle spasm  Medical/Referring Diagnosis:    Lumbar spondylosis  Spondylolisthesis of lumbar region  Lumbar radiculopathy      Referring Physician:  Thomas Allen PA MD Orders:  PT Eval and Treat   Return MD Appt:  TBD   Date of Onset:  Onset Date: 21     Allergies:   Latex, Fluoxetine, Phenazopyridine, Sulfa antibiotics, and Fenofibrate  Restrictions/Precautions:   None      Interventions Planned (Treatment may consist of any combination of the following):     See Assessment Note    Subjective Comments:   See evaluation  Initial Pain Level::     8/10  Post Session Pain Level:       0/10  Medications Last Reviewed:  7/15/2024  Updated Objective Findings:  See Evaluation Note from today  Treatment   TREATMENT:   THERAPEUTIC ACTIVITY: ( see below for minutes): Therapeutic activities per grid below to improve mobility, strength, balance and coordination. Required minimal visual, verbal, manual and tactile cues to improve independence and safety with daily activities .  THERAPEUTIC EXERCISE: (see below for minutes): Exercises per grid below to improve mobility, strength, balance

## 2024-07-15 NOTE — THERAPY EVALUATION
ambulation.  Patient will be able to stand for greater than 30 min without pain.  Patient will be able to cook without difficulty.  Patient will be able to work in her yard with less than 2/10 pain.       Outcome Measure:   Tool Used: Modified Oswestry Low Back Pain Questionnaire  Score:  Initial: 38/50  Most Recent: X/50 (Date: -- )   Interpretation of Score: Each section is scored on a 0-5 scale, 5 representing the greatest disability.  The scores of each section are added together for a total score of 50.      Medical Necessity:   > Patient is expected to demonstrate progress in strength, range of motion, balance, and functional technique to improve ambulation without pain.  Reason For Services/Other Comments:  > Patient has pain in the lumbar spine and hips which is preventing her from ambulating without pain. Skilled therapy is needed to improve her QOL and function.      Regarding Alana AVILA Marla's therapy, I certify that the treatment plan above will be carried out by a therapist or under their direction.  Thank you for this referral,  SUSAN MAR PT     Referring Physician Signature: Thomas Allen PA _______________________________ Date _____________        Charge Capture  Events  Appt Desk  Attendance Report

## 2024-07-16 ASSESSMENT — PAIN SCALES - GENERAL: PAINLEVEL_OUTOF10: 8

## 2024-07-17 ENCOUNTER — HOSPITAL ENCOUNTER (OUTPATIENT)
Dept: PHYSICAL THERAPY | Age: 79
Setting detail: RECURRING SERIES
Discharge: HOME | End: 2024-07-20
Payer: MEDICARE

## 2024-07-17 PROCEDURE — 97140 MANUAL THERAPY 1/> REGIONS: CPT

## 2024-07-17 PROCEDURE — 97110 THERAPEUTIC EXERCISES: CPT

## 2024-07-17 NOTE — PROGRESS NOTES
THORACIC MOBILITY, QUAD MOBILITY, HIP MOBILITY.    >Total Treatment Billable Duration:  45 minutes   Time In: 0930  Time Out: 1015    MARQUISE NYE PT         Charge Capture  Events  [employer] Portal  Appt Desk  Attendance Report     Future Appointments   Date Time Provider Department Center   7/22/2024  9:30 AM Marquise Nye, PT [facility]   7/24/2024  9:30 AM Marquise Nye, PT [facility]   7/29/2024  9:30 AM Marquise Nye, PT [facility]   7/31/2024  9:30 AM Marquise Nye, PT [facility]   8/2/2024  4:40 PM Cindy Navarrete DO [facility]   8/27/2024  1:30 PM Thomas Allen PA [facility]        Detail Level: None

## 2024-07-22 ENCOUNTER — HOSPITAL ENCOUNTER (OUTPATIENT)
Dept: PHYSICAL THERAPY | Age: 79
Setting detail: RECURRING SERIES
Discharge: HOME | End: 2024-07-25
Payer: MEDICARE

## 2024-07-22 PROCEDURE — 97110 THERAPEUTIC EXERCISES: CPT

## 2024-07-22 PROCEDURE — 97140 MANUAL THERAPY 1/> REGIONS: CPT

## 2024-07-22 NOTE — PROGRESS NOTES
activities .  THERAPEUTIC EXERCISE: (see below for minutes): Exercises per grid below to improve mobility, strength, balance and coordination. Required minimal verbal and manual cues to promote proper body alignment, promote proper body posture and promote proper body mechanics. Progressed resistance, range, repetitions and complexity of movement as indicated.  MANUAL THERAPY: (see below for minutes): Joint mobilization and Soft tissue mobilization was utilized and necessary because of the patient's restricted joint motion, painful spasm, loss of articular motion and restricted motion of soft tissue.   MODALITIES: (see below for minutes): to decrease pain   SELF CARE: (see below for minutes): Procedure(s) (per grid) utilized to improve and/or restore self-care/home management as related to dressing, bathing and grooming. Required minimal verbal cueing to facilitate activities of daily living skills and compensatory activities    Date: 7/15/24  Visit 1  EVAL 7/17/24  Visit 2 7/22/24  Visit 3     Modalities:                                Therapeutic Exercise: 30 min 30 min 30 min      Review of POC and HEP and anatomy Hip testing and review of hep Revised hep to restrict LTR and assist supine passive knee to chest      Thoracic extension and flexion motor control training Seated cat camel with scap pinch x 30    Seated rotation x 30 arlyn    LTR x 30    SKTC x 30 Seated cat camel with scap pinch x 30    Seated rotation x 30 arlyn    LTR x 30    SKTC x 30         Thoracolumbar rotation in sitting x 30  Edu seated TLJ rotation to hands on lap                             Proprioceptive Activities:                                Manual Therapy:  15 min 15 min       STM bilateral lumbar paraspinals STM bilateral lumbar paraspinals    RF stretch sidelying bilaterally    STM left glut med, glut ERs                Functional Activities:                                            Treatment/Session Summary:    Treatment

## 2024-07-24 ENCOUNTER — HOSPITAL ENCOUNTER (OUTPATIENT)
Dept: PHYSICAL THERAPY | Age: 79
Setting detail: RECURRING SERIES
Discharge: HOME | End: 2024-07-27
Payer: MEDICARE

## 2024-07-24 PROCEDURE — 97140 MANUAL THERAPY 1/> REGIONS: CPT

## 2024-07-24 PROCEDURE — 97110 THERAPEUTIC EXERCISES: CPT

## 2024-07-24 NOTE — PROGRESS NOTES
Alana Jacksoney  : 1945  Primary: [facility] Mmp Dual (Medicare Managed)  Secondary: [facility] MEDICAID [facility] @ [place] [address] [address] [facility] [address]  Phone: [phone]  Fax: [phone] Plan Frequency: 1-2 times per week    Plan of Care/Certification Expiration Date: 10/14/24        Plan of Care/Certification Expiration Date:  Plan of Care/Certification Expiration Date: 10/14/24    Frequency/Duration: Plan Frequency: 1-2 times per week      Time In/Out:   Time In: 930  Time Out: 1015      PT Visit Info:         Visit Count:  4    OUTPATIENT PHYSICAL THERAPY:   Treatment Note 2024       Episode  (low back pain rehab)               Treatment Diagnosis:    Vertebrogenic low back pain  Pain in right hip  Pain in left hip  Pain in left leg  Muscle spasm of back  Other muscle spasm  Medical/Referring Diagnosis:    Lumbar spondylosis  Spondylolisthesis of lumbar region  Lumbar radiculopathy      Referring Physician:  Thomas Allen PA MD Orders:  PT Eval and Treat   Return MD Appt:  TBD   Date of Onset:  Onset Date: 21     Allergies:   Latex, Fluoxetine, Phenazopyridine, Sulfa antibiotics, and Fenofibrate  Restrictions/Precautions:   None      Interventions Planned (Treatment may consist of any combination of the following):     See Assessment Note    Subjective Comments:   Patient notes bilateral knees are sore. Patient notes soreness in the left glut posterior and lateral.   Initial Pain Level::     3 /10  Post Session Pain Level:       3 /10  Medications Last Reviewed:  2024  Updated Objective Findings:      24:  Hip extension right: 0 deg  Hip extension left: 0 deg  Muscle tension: tight RF bilateral    Treatment   TREATMENT:   THERAPEUTIC ACTIVITY: ( see below for minutes): Therapeutic activities per grid below to improve mobility, strength, balance and coordination. Required minimal visual, verbal, manual and tactile cues to

## 2024-07-29 ENCOUNTER — TELEPHONE (OUTPATIENT)
Dept: FAMILY MEDICINE CLINIC | Facility: CLINIC | Age: 79
End: 2024-07-29

## 2024-07-29 ENCOUNTER — HOSPITAL ENCOUNTER (OUTPATIENT)
Dept: PHYSICAL THERAPY | Age: 79
Setting detail: RECURRING SERIES
Discharge: HOME | End: 2024-08-01
Payer: MEDICARE

## 2024-07-29 PROCEDURE — 97140 MANUAL THERAPY 1/> REGIONS: CPT

## 2024-07-29 PROCEDURE — 97110 THERAPEUTIC EXERCISES: CPT

## 2024-07-29 NOTE — PROGRESS NOTES
Alana AUSTIN Marla  : 1945  Primary: [facility] Mmp Dual (Medicare Managed)  Secondary: [facility] MEDICAID [facility] [place] [address] [facility] [facility] [address]  Phone: [phone]  Fax: [phone] Plan Frequency: 1-2 times per week    Plan of Care/Certification Expiration Date: 10/14/24        Plan of Care/Certification Expiration Date:  Plan of Care/Certification Expiration Date: 10/14/24    Frequency/Duration: Plan Frequency: 1-2 times per week      Time In/Out:   Time In: 930  Time Out: 1015      PT Visit Info:         Visit Count:  5    OUTPATIENT PHYSICAL THERAPY:   Treatment Note 2024       Episode  (low back pain rehab)               Treatment Diagnosis:    Vertebrogenic low back pain  Pain in right hip  Pain in left hip  Pain in left leg  Muscle spasm of back  Other muscle spasm  Medical/Referring Diagnosis:    Lumbar spondylosis  Spondylolisthesis of lumbar region  Lumbar radiculopathy      Referring Physician:  Thomas Allen PA MD Orders:  PT Eval and Treat   Return MD Appt:  TBD   Date of Onset:  Onset Date: 21     Allergies:   Latex, Fluoxetine, Phenazopyridine, Sulfa antibiotics, and Fenofibrate  Restrictions/Precautions:   None      Interventions Planned (Treatment may consist of any combination of the following):     See Assessment Note    Subjective Comments:   Patient notes that she was sore post last treatment for that day. She notes that she has been able to walk more upright. Notes soreness into the anterior thigh and anterior hip.   Initial Pain Level::     3 /10  Post Session Pain Level:       3 /10  Medications Last Reviewed:  2024  Updated Objective Findings:      24:  Hip extension right: 0 deg  Hip extension left: 0 deg  Muscle tension: tight RF bilateral    24:  Stands upright with L2-T10 effort    Treatment   TREATMENT:   THERAPEUTIC ACTIVITY: ( see below for minutes): Therapeutic activities per grid  Private car

## 2024-07-31 ENCOUNTER — NURSE ONLY (OUTPATIENT)
Dept: FAMILY MEDICINE CLINIC | Facility: CLINIC | Age: 79
End: 2024-07-31
Payer: MEDICARE

## 2024-07-31 ENCOUNTER — HOSPITAL ENCOUNTER (OUTPATIENT)
Dept: PHYSICAL THERAPY | Age: 79
Setting detail: RECURRING SERIES
Discharge: HOME | End: 2024-08-03
Payer: MEDICARE

## 2024-07-31 DIAGNOSIS — R30.0 DYSURIA: Primary | ICD-10-CM

## 2024-07-31 LAB
BILIRUBIN, URINE, POC: ABNORMAL
BLOOD URINE, POC: NEGATIVE
GLUCOSE URINE, POC: NEGATIVE
KETONES, URINE, POC: ABNORMAL
LEUKOCYTE ESTERASE, URINE, POC: NEGATIVE
NITRITE, URINE, POC: NEGATIVE
PH, URINE, POC: 5 (ref 4.6–8)
PROTEIN,URINE, POC: ABNORMAL
SPECIFIC GRAVITY, URINE, POC: 1.02 (ref 1–1.03)
URINALYSIS CLARITY, POC: ABNORMAL
URINALYSIS COLOR, POC: ABNORMAL
UROBILINOGEN, POC: ABNORMAL

## 2024-07-31 PROCEDURE — 97110 THERAPEUTIC EXERCISES: CPT

## 2024-07-31 PROCEDURE — 97140 MANUAL THERAPY 1/> REGIONS: CPT

## 2024-07-31 PROCEDURE — 81003 URINALYSIS AUTO W/O SCOPE: CPT | Performed by: FAMILY MEDICINE

## 2024-07-31 NOTE — PROGRESS NOTES
Alana Gutiérrez  : 1945  Primary: [facility] Mmp Dual (Medicare Managed)  Secondary: [facility] MEDICAID [facility] @ [place] [address] [address] [facility] [address]  Phone: [phone]  Fax: [phone] Plan Frequency: 1-2 times per week    Plan of Care/Certification Expiration Date: 10/14/24        Plan of Care/Certification Expiration Date:  Plan of Care/Certification Expiration Date: 10/14/24    Frequency/Duration: Plan Frequency: 1-2 times per week      Time In/Out:   Time In: 930  Time Out: 1030      PT Visit Info:         Visit Count:  6    OUTPATIENT PHYSICAL THERAPY:   Treatment Note 2024       Episode  (low back pain rehab)               Treatment Diagnosis:    Vertebrogenic low back pain  Pain in right hip  Pain in left hip  Pain in left leg  Muscle spasm of back  Other muscle spasm  Medical/Referring Diagnosis:    Lumbar spondylosis  Spondylolisthesis of lumbar region  Lumbar radiculopathy      Referring Physician:  Thomas Allen PA MD Orders:  PT Eval and Treat   Return MD Appt:  TBD   Date of Onset:  Onset Date: 21     Allergies:   Latex, Fluoxetine, Phenazopyridine, Sulfa antibiotics, and Fenofibrate  Restrictions/Precautions:   None      Interventions Planned (Treatment may consist of any combination of the following):     See Assessment Note    Subjective Comments:   Patient notes that she had improved pain directly post last session. She woke up in the morning sore in the lumbar spine. She sat in the car for over 90 minutes and noticed pain after when she would attempt to stand upright.     Initial Pain Level::     3 /10  Post Session Pain Level:       3 /10  Medications Last Reviewed:  2024  Updated Objective Findings:      24:  Hip extension right: 0 deg  Hip extension left: 0 deg  Muscle tension: tight RF bilateral    24:  Stands upright with L2-T10 effort    Treatment   TREATMENT:   THERAPEUTIC ACTIVITY: ( see below

## 2024-08-02 ENCOUNTER — TELEMEDICINE (OUTPATIENT)
Dept: FAMILY MEDICINE CLINIC | Facility: CLINIC | Age: 79
End: 2024-08-02

## 2024-08-02 DIAGNOSIS — R35.0 URINARY FREQUENCY: ICD-10-CM

## 2024-08-02 DIAGNOSIS — F41.9 ANXIETY: Primary | ICD-10-CM

## 2024-08-02 LAB
BACTERIA SPEC CULT: NORMAL
SERVICE CMNT-IMP: NORMAL

## 2024-08-02 RX ORDER — BUSPIRONE HYDROCHLORIDE 10 MG/1
10 TABLET ORAL 2 TIMES DAILY
Qty: 180 TABLET | Refills: 0 | Status: SHIPPED | OUTPATIENT
Start: 2024-08-02 | End: 2024-10-31

## 2024-08-02 ASSESSMENT — PATIENT HEALTH QUESTIONNAIRE - PHQ9
SUM OF ALL RESPONSES TO PHQ QUESTIONS 1-9: 0
2. FEELING DOWN, DEPRESSED OR HOPELESS: NOT AT ALL
SUM OF ALL RESPONSES TO PHQ9 QUESTIONS 1 & 2: 0
SUM OF ALL RESPONSES TO PHQ QUESTIONS 1-9: 0
1. LITTLE INTEREST OR PLEASURE IN DOING THINGS: NOT AT ALL

## 2024-08-02 ASSESSMENT — ENCOUNTER SYMPTOMS
DIARRHEA: 0
SHORTNESS OF BREATH: 0
COUGH: 0
CONSTIPATION: 0
NAUSEA: 0
ABDOMINAL PAIN: 0
VOMITING: 0

## 2024-08-02 NOTE — PROGRESS NOTES
Alana Gutiérrez, was evaluated through a synchronous (real-time) audio-video encounter. The patient (or guardian if applicable) is aware that this is a billable service, which includes applicable co-pays. This Virtual Visit was conducted with patient's (and/or legal guardian's) consent. Patient identification was verified, and a caregiver was present when appropriate.   The patient was located at Home: [address] [place] [address]  Provider was located at Facility (Appt Dept): [address] [facility] [place] [address]  Confirm you are appropriately licensed, registered, or certified to deliver care in the state where the patient is located as indicated above. If you are not or unsure, please re-schedule the visit: Yes, I confirm.     Alana Gutiérrez (:  1945) is a Established patient, presenting virtually for evaluation of the following:    Assessment & Plan   Below is the assessment and plan developed based on review of pertinent history, physical exam, labs, studies, and medications.  1. Anxiety  -     busPIRone (BUSPAR) 10 MG tablet; Take 1 tablet by mouth in the morning and at bedtime, Disp-180 tablet, R-0Normal  2. Urinary frequency  Increased Buspar to 10 mg BID. No UTI.   No follow-ups on file.       Subjective   78-year-old  female here for follow-up of anxiety.  On last office visit I put her on BuSpar 5 mg twice daily.  Patient states that it helps somewhat, but she feels that it needs to be increased.  She has had a lot going on recently with the back pain and being in physical therapy.  She has not had any side effects from the BuSpar.  She did well to go over her UA results that she left 2 days ago.  UA showed protein.  Urine culture was negative.  Patient has had frequency at night.     Review of Systems   Constitutional:  Negative for chills, fatigue and fever.   Respiratory:  Negative for cough and shortness of breath.    Cardiovascular:  Negative for chest

## 2024-08-06 ENCOUNTER — HOSPITAL ENCOUNTER (OUTPATIENT)
Dept: PHYSICAL THERAPY | Age: 79
Setting detail: RECURRING SERIES
Discharge: HOME | End: 2024-08-09
Payer: MEDICARE

## 2024-08-06 PROCEDURE — 97140 MANUAL THERAPY 1/> REGIONS: CPT

## 2024-08-06 PROCEDURE — 97110 THERAPEUTIC EXERCISES: CPT

## 2024-08-06 NOTE — PROGRESS NOTES
scap pinch x 30    Seated rotation x 30 arlyn    LTR x 30    SKTC x 30 Seated cat camel with scap pinch x 30    Seated rotation x 30 arlyn    LTR x 30    SKTC x 30    PPT x 2 min    TrA x 2 min - x iso abd - x iso add    LTR x 30 to the right only    SKTC x 10 each side (found that more than 10 was sore)  STS 2 x 10 to walker     Thoracolumbar rotation in sitting x 30  Edu seated TLJ rotation to hands on lap   Educated patient in proper performance for HEP    Reviewed POC and prognosis and anatomy/pathology with patient                                  Proprioceptive Activities:                                        Manual Therapy:  15 min 15 min 15 min 30 min 30 min 15 min     STM bilateral lumbar paraspinals STM bilateral lumbar paraspinals    RF stretch sidelying bilaterally    STM left glut med, glut ERs    UPA in right sidelying to the left L4,5 grade 3-4     Right sidelying lumbar rotation mobs grade 2    STM left glut med, piriformis    Supine lumbar distraction grade 3    STM left anterior quad and VL Supine lumbar distraction grade 3    Left hip inferior and lateral glides grade 3 UPA left L5 grade 2-3    STM left hip ER's and glut med gentle             Functional Activities:                                                      Treatment/Session Summary:    Treatment Assessment:   Patient progressed to HEP including lumbar flexion to modulate symptoms.     Communication/Consultation:  Spoke with patient in regards to PT POC.  Equipment provided today:  HEP  Recommendations/Intent for next treatment session: Next visit will focus on LUMBAR FLEXION , THORACIC MOBILITY, QUAD MOBILITY, HIP MOBILITY.    >Total Treatment Billable Duration:  45 minutes   Time In: 0845  Time Out: 0930    MARQUISE NYE PT         Charge Capture  Events  [employer] Portal  Appt Desk  Attendance Report     Future Appointments   Date Time Provider Department Center   8/8/2024  3:30 PM Marquise Nye PT [facility]   8/12/2024  8:45 AM

## 2024-08-08 ENCOUNTER — HOSPITAL ENCOUNTER (OUTPATIENT)
Dept: PHYSICAL THERAPY | Age: 79
Setting detail: RECURRING SERIES
Discharge: HOME | End: 2024-08-11
Payer: MEDICARE

## 2024-08-08 PROCEDURE — 97110 THERAPEUTIC EXERCISES: CPT

## 2024-08-08 PROCEDURE — 97140 MANUAL THERAPY 1/> REGIONS: CPT

## 2024-08-08 NOTE — PROGRESS NOTES
Alana Gutiérrez  : 1945  Primary: [facility] Mmp Dual (Medicare Managed)  Secondary:  [facility] [place]  [place] [address]  Phone: [phone]  Fax: [phone] Plan Frequency: 1-2 times per week    Plan of Care/Certification Expiration Date: 10/14/24        Plan of Care/Certification Expiration Date:  Plan of Care/Certification Expiration Date: 10/14/24    Frequency/Duration: Plan Frequency: 1-2 times per week      Time In/Out:   Time In: 0330  Time Out: 415      PT Visit Info:         Visit Count:  8    OUTPATIENT PHYSICAL THERAPY:   Re-Cert/ Progress Note/Treatment Note 2024       Episode  (low back pain rehab)               Treatment Diagnosis:    Vertebrogenic low back pain  Pain in right hip  Pain in left hip  Pain in left leg  Muscle spasm of back  Other muscle spasm  Medical/Referring Diagnosis:    Lumbar spondylosis  Spondylolisthesis of lumbar region  Lumbar radiculopathy      Referring Physician:  Thomas Allen PA MD Orders:  PT Eval and Treat   Return MD Appt:  TBD   Date of Onset:  Onset Date: 21     Allergies:   Latex, Fluoxetine, Phenazopyridine, Sulfa antibiotics, and Fenofibrate  Restrictions/Precautions:   None      Interventions Planned (Treatment may consist of any combination of the following):     See Assessment Note    Subjective Comments:   Patient notes she is feeling better today. Patient notes that she didn't feel as stiff when she woke up this morning. Patient notes that she felt like she was standing up taller. She also notes that when she walked in the store a few days ago she noticed she was able to walk further with less pain in her leg and back. She notes that bringing her knees to her chest is no longer painful and reduces her back pain and leg pain.     Initial Pain Level::     5.5 /10  Post Session Pain Level:       4 /10 (less pain noted)  Medications Last Reviewed:  2024  Updated Objective Findings:

## 2024-08-09 ASSESSMENT — PAIN SCALES - GENERAL: PAINLEVEL_OUTOF10: 5

## 2024-08-09 NOTE — THERAPY RECERTIFICATION
Training, IADL Training, and Dry Needling          Outcome Measure:   Tool Used: Modified Oswestry Low Back Pain Questionnaire  Score:  Initial: 38/50  Most Recent: X/50 (Date: not performed today )   Interpretation of Score: Each section is scored on a 0-5 scale, 5 representing the greatest disability.  The scores of each section are added together for a total score of 50.      Medical Necessity:   > Patient is expected to demonstrate progress in strength, range of motion, balance, and functional technique to improve ambulation without pain.  Reason For Services/Other Comments:  > Patient has pain in the lumbar spine and hips which is preventing her from ambulating without pain. Skilled therapy is needed to improve her QOL and function.      Regarding Alana Gutiérrez's therapy, I certify that the treatment plan above will be carried out by a therapist or under their direction.  Thank you for this referral,  SUSAN MAR PT     Referring Physician Signature: Thomas Allen PA _______________________________ Date _____________        Charge Capture  Events  Appt Desk  Attendance Report

## 2024-08-12 ENCOUNTER — HOSPITAL ENCOUNTER (OUTPATIENT)
Dept: PHYSICAL THERAPY | Age: 79
Setting detail: RECURRING SERIES
End: 2024-08-12
Payer: MEDICARE

## 2024-08-12 NOTE — PROGRESS NOTES
Alana Gutiérrez  : 1945  Primary: [facility] Mmp Dual (Medicare Managed)  Secondary:  [facility] [place]  [place] [address]  Phone: [phone]  Fax: [phone] Plan Frequency: 1-2 times per week    Plan of Care/Certification Expiration Date: 10/14/24        Plan of Care/Certification Expiration Date:  Plan of Care/Certification Expiration Date: 10/14/24    Frequency/Duration: Plan Frequency: 1-2 times per week      Time In/Out:          PT Visit Info:         Visit Count:  9    OUTPATIENT PHYSICAL THERAPY:   Treatment Note 2024       Episode  (low back pain rehab)               Treatment Diagnosis:    Vertebrogenic low back pain  Pain in right hip  Pain in left hip  Pain in left leg  Muscle spasm of back  Other muscle spasm  Medical/Referring Diagnosis:    Lumbar spondylosis  Spondylolisthesis of lumbar region  Lumbar radiculopathy      Referring Physician:  Thomas Allen PA MD Orders:  PT Eval and Treat   Return MD Appt:  TBD   Date of Onset:  Onset Date: 21     Allergies:   Latex, Fluoxetine, Phenazopyridine, Sulfa antibiotics, and Fenofibrate  Restrictions/Precautions:   None      Interventions Planned (Treatment may consist of any combination of the following):     See Assessment Note    Subjective Comments:   Patient notes she is feeling better today. Patient notes that she didn't feel as stiff when she woke up this morning. Patient notes that she felt like she was standing up taller. She also notes that when she walked in the store a few days ago she noticed she was able to walk further with less pain in her leg and back. She notes that bringing her knees to her chest is no longer painful and reduces her back pain and leg pain.     Initial Pain Level::     5.5 /10  Post Session Pain Level:       4 /10 (less pain noted)  Medications Last Reviewed:  2024  Updated Objective Findings:      24:  Hip extension right: 0 deg  Hip  extension left: 0 deg  Muscle tension: tight RF bilateral    7/29/24:  Stands upright with L2-T10 effort    8/8/24 Progress Note/Re-Certification:  Hip extension right: 0 deg  Hip extension left: 0 deg  Hip flexion: WFL no pain  Lumbar flexion: WFL - no pain noted  Lumbar extension: stiff lower lumbar (L>R)  Lumbar SB left: pain ipsilateral left  Lumbar SB right: tight left lumbar  Upright ambulation: able to ambulate in upright posture with less pain and with less forward trunk position    Goals:   Short-Term Functional Goals: Time Frame: 8 weeks +  Patient will achieve upright standing with 0/10 pain. (PROGRESSING)  Patient will demonstrate HEP independently. (MET)  Patient will demonstrate lumbar flexion and return from flexion without pain. (MET)  Discharge Goals: Time Frame: 12 weeks +  Patient will report no pain with ambulation. (PROGRESSING)  Patient will be able to stand for greater than 30 min without pain. (PROGRESSING)  Patient will be able to cook without difficulty. (PROGRESSING)  Patient will be able to work in her yard with less than 2/10 pain. (PROGRESSING)    Treatment   TREATMENT:   THERAPEUTIC ACTIVITY: ( see below for minutes): Therapeutic activities per grid below to improve mobility, strength, balance and coordination. Required minimal visual, verbal, manual and tactile cues to improve independence and safety with daily activities .  THERAPEUTIC EXERCISE: (see below for minutes): Exercises per grid below to improve mobility, strength, balance and coordination. Required minimal verbal and manual cues to promote proper body alignment, promote proper body posture and promote proper body mechanics. Progressed resistance, range, repetitions and complexity of movement as indicated.  MANUAL THERAPY: (see below for minutes): Joint mobilization and Soft tissue mobilization was utilized and necessary because of the patient's restricted joint motion, painful spasm, loss of articular motion and restricted

## 2024-08-15 ENCOUNTER — HOSPITAL ENCOUNTER (OUTPATIENT)
Dept: PHYSICAL THERAPY | Age: 79
Setting detail: RECURRING SERIES
Discharge: HOME | End: 2024-08-18
Payer: MEDICARE

## 2024-08-15 PROCEDURE — 97140 MANUAL THERAPY 1/> REGIONS: CPT

## 2024-08-15 PROCEDURE — 97110 THERAPEUTIC EXERCISES: CPT

## 2024-08-15 NOTE — PROGRESS NOTES
activities of daily living skills and compensatory activities    Date: 7/15/24  Visit 1  EVAL 7/17/24  Visit 2 7/22/24  Visit 3 7/24/24  Visit 4 7/29/24  Visit 5 7/31/24  Visit 6 8/6/24  Visit 7 8/8/24  Visit 8  Progress Note/Re-Cert 8/15/24  Visit 9   Modalities:                                                Therapeutic Exercise: 30 min 30 min 30 min 30 min 15 min 30 min  30 min 15 min    Review of POC and HEP and anatomy Hip testing and review of hep Revised hep to restrict LTR and assist supine passive knee to chest  Standing at wall posture check 2 x 15 seconds    Seated superior lumbar flexion with hands on lap x 30    Scapular retraction x 20 Scapular retraction x 30    Seated lumbar rotation x 20 arlyn    Seated thoracic flexion x 20    DKTC with ball x 20    PPT x 20 Scapular retraction x 20    Seated lumbar flexion with ball x 30    LTR with assist x 10 arlyn    Supine DKTC with ball x 20    Educated in using repeated lumbar flexion in sitting to modulate symptoms Scapular retraction x 30    Seated lumbar flexion with ball x 30    LTR with assist x 10 arlyn    Supine DKTC with ball x 20    [place] -  Standing double leg RDL with walker for support 3 x 10 with cues for glut squeeze      Nustep x 2 min    Supine and seated piriformis stretch x 30 times x 1-2 second holds    Instructed in HEP     Thoracic extension and flexion motor control training Seated cat camel with scap pinch x 30    Seated rotation x 30 arlyn    LTR x 30    SKTC x 30 Seated cat camel with scap pinch x 30    Seated rotation x 30 arlyn    LTR x 30    SKTC x 30    PPT x 2 min    TrA x 2 min - x iso abd - x iso add    LTR x 30 to the right only    SKTC x 10 each side (found that more than 10 was sore)  STS 2 x 10 to walker       Thoracolumbar rotation in sitting x 30  Edu seated TLJ rotation to hands on lap   Educated patient in proper performance for HEP    Reviewed POC and prognosis and anatomy/pathology with patient

## 2024-08-19 ENCOUNTER — HOSPITAL ENCOUNTER (OUTPATIENT)
Dept: PHYSICAL THERAPY | Age: 79
Setting detail: RECURRING SERIES
Discharge: HOME | End: 2024-08-22
Payer: MEDICARE

## 2024-08-19 PROCEDURE — 97110 THERAPEUTIC EXERCISES: CPT

## 2024-08-19 PROCEDURE — 97140 MANUAL THERAPY 1/> REGIONS: CPT

## 2024-08-19 NOTE — PROGRESS NOTES
PTA [facility]   9/4/2024  9:30 AM Marquise Nye, PT [facility]   9/9/2024  9:30 AM Marquise Nye, PT [facility]   9/11/2024  9:30 AM Marquise Nye, PT [facility]   9/16/2024  9:30 AM Marquise Nye, PT [facility]   9/19/2024 11:00 AM Marquise Nye, PT [facility]   9/23/2024  9:30 AM Marquise Nye, PT [facility]   9/26/2024 10:15 AM Marquise Nye, PT [facility]   9/30/2024 10:15 AM Marquise Nye, PT [facility]

## 2024-08-21 ENCOUNTER — APPOINTMENT (OUTPATIENT)
Dept: PHYSICAL THERAPY | Age: 79
End: 2024-08-21
Payer: MEDICARE

## 2024-08-26 ENCOUNTER — APPOINTMENT (OUTPATIENT)
Dept: PHYSICAL THERAPY | Age: 79
End: 2024-08-26
Payer: MEDICARE

## 2024-08-28 ENCOUNTER — APPOINTMENT (OUTPATIENT)
Dept: PHYSICAL THERAPY | Age: 79
End: 2024-08-28
Payer: MEDICARE

## 2024-11-05 DIAGNOSIS — F41.1 ANXIETY STATE: Primary | ICD-10-CM

## 2024-11-05 DIAGNOSIS — K21.9 GASTROESOPHAGEAL REFLUX DISEASE WITHOUT ESOPHAGITIS: ICD-10-CM

## 2024-11-05 RX ORDER — BUSPIRONE HYDROCHLORIDE 10 MG/1
10 TABLET ORAL 2 TIMES DAILY
Qty: 180 TABLET | Refills: 1 | Status: SHIPPED | OUTPATIENT
Start: 2024-11-05 | End: 2025-05-04

## 2024-11-05 RX ORDER — FAMOTIDINE 40 MG/1
40 TABLET, FILM COATED ORAL DAILY
Qty: 90 TABLET | Refills: 1 | Status: SHIPPED | OUTPATIENT
Start: 2024-11-05

## 2024-11-05 NOTE — TELEPHONE ENCOUNTER
Patient called requesting refill of Famotidine 40 mg and Buspar 10 mg to be sent to [facility] pharmacy.   Patient has appointment 12/3/2024 and needs refills to last until appointment.

## 2024-12-03 ENCOUNTER — OFFICE VISIT (OUTPATIENT)
Dept: FAMILY MEDICINE CLINIC | Facility: CLINIC | Age: 79
End: 2024-12-03
Payer: MEDICARE

## 2024-12-03 VITALS
RESPIRATION RATE: 16 BRPM | OXYGEN SATURATION: 98 % | HEART RATE: 66 BPM | HEIGHT: 67 IN | DIASTOLIC BLOOD PRESSURE: 66 MMHG | WEIGHT: 155.6 LBS | BODY MASS INDEX: 24.42 KG/M2 | SYSTOLIC BLOOD PRESSURE: 116 MMHG

## 2024-12-03 DIAGNOSIS — I10 HTN (HYPERTENSION), BENIGN: ICD-10-CM

## 2024-12-03 DIAGNOSIS — F51.01 PRIMARY INSOMNIA: ICD-10-CM

## 2024-12-03 DIAGNOSIS — K21.9 GASTROESOPHAGEAL REFLUX DISEASE WITHOUT ESOPHAGITIS: ICD-10-CM

## 2024-12-03 DIAGNOSIS — E78.2 MIXED HYPERLIPIDEMIA: Primary | ICD-10-CM

## 2024-12-03 DIAGNOSIS — J30.89 ENVIRONMENTAL AND SEASONAL ALLERGIES: ICD-10-CM

## 2024-12-03 PROCEDURE — 1123F ACP DISCUSS/DSCN MKR DOCD: CPT | Performed by: FAMILY MEDICINE

## 2024-12-03 PROCEDURE — 3074F SYST BP LT 130 MM HG: CPT | Performed by: FAMILY MEDICINE

## 2024-12-03 PROCEDURE — 3078F DIAST BP <80 MM HG: CPT | Performed by: FAMILY MEDICINE

## 2024-12-03 PROCEDURE — 1159F MED LIST DOCD IN RCRD: CPT | Performed by: FAMILY MEDICINE

## 2024-12-03 PROCEDURE — 99214 OFFICE O/P EST MOD 30 MIN: CPT | Performed by: FAMILY MEDICINE

## 2024-12-03 RX ORDER — DICYCLOMINE HCL 20 MG
20 TABLET ORAL 2 TIMES DAILY
Qty: 60 TABLET | Refills: 2 | Status: SHIPPED | OUTPATIENT
Start: 2024-12-03

## 2024-12-03 RX ORDER — FLUTICASONE PROPIONATE 50 MCG
SPRAY, SUSPENSION (ML) NASAL
Qty: 32 G | Refills: 1 | Status: SHIPPED | OUTPATIENT
Start: 2024-12-03

## 2024-12-03 RX ORDER — TRAZODONE HYDROCHLORIDE 100 MG/1
100 TABLET ORAL NIGHTLY
Qty: 90 TABLET | Refills: 1 | Status: SHIPPED | OUTPATIENT
Start: 2024-12-03

## 2024-12-03 RX ORDER — LORATADINE 10 MG/1
10 TABLET ORAL DAILY
Qty: 90 TABLET | Refills: 1 | Status: SHIPPED | OUTPATIENT
Start: 2024-12-03

## 2024-12-03 RX ORDER — LOSARTAN POTASSIUM 50 MG/1
50 TABLET ORAL DAILY
Qty: 90 TABLET | Refills: 1 | Status: SHIPPED | OUTPATIENT
Start: 2024-12-03

## 2024-12-03 RX ORDER — HYDROCHLOROTHIAZIDE 25 MG/1
25 TABLET ORAL DAILY
Qty: 90 TABLET | Refills: 1 | Status: SHIPPED | OUTPATIENT
Start: 2024-12-03

## 2024-12-03 RX ORDER — FAMOTIDINE 40 MG/1
40 TABLET, FILM COATED ORAL DAILY
Qty: 90 TABLET | Refills: 1 | Status: SHIPPED | OUTPATIENT
Start: 2024-12-03

## 2024-12-03 RX ORDER — NEBIVOLOL 10 MG/1
10 TABLET ORAL DAILY
Qty: 90 TABLET | Refills: 1 | Status: SHIPPED | OUTPATIENT
Start: 2024-12-03

## 2024-12-03 RX ORDER — AMLODIPINE BESYLATE 10 MG/1
5-10 TABLET ORAL DAILY
Qty: 90 TABLET | Refills: 1 | Status: SHIPPED | OUTPATIENT
Start: 2024-12-03

## 2024-12-03 ASSESSMENT — ENCOUNTER SYMPTOMS
SHORTNESS OF BREATH: 0
CONSTIPATION: 0
BACK PAIN: 1
NAUSEA: 0
ABDOMINAL PAIN: 0
VOMITING: 0
COUGH: 0
DIARRHEA: 0

## 2024-12-03 NOTE — PROGRESS NOTES
PROGRESS NOTE    SUBJECTIVE:   Alana Gutiérrez is a 79 y.o. female seen for a follow up visit regarding follow-up of chronic problems.  Patient has hypertension that is controlled on losartan 50 mg daily, Norvasc 10 mg daily and hydrochlorothiazide 25 mg daily.  She has hypothyroidism from surgical removal of thyroid and sees endocrinology.  She is on Levoxyl.  TSH yesterday was elevated.  Her endocrinologist wants her to take Levoxyl at night because she is having some congestion from it.  She has hyperlipidemia, but is not on any cholesterol medication.  She takes trazodone for insomnia.  She is on BuSpar for anxiety.  It was recently increased to 10 mg.  She has been taking twice a day.  In August her last son passed away.  He was extremely healthy, but done his sleep MRI.  He has not been having chest pain or other symptoms.  This has been very hard for her, especially because he was  and had no children, so she is going through probate for him.        Past Medical History, Past Surgical History, Family history, Social History, and Medications were all reviewed with the patient today and updated as necessary.       Current Outpatient Medications   Medication Sig Dispense Refill    dicyclomine (BENTYL) 20 MG tablet Take 1 tablet by mouth 2 times daily 60 tablet 2    hydroCHLOROthiazide (HYDRODIURIL) 25 MG tablet Take 1 tablet by mouth daily 90 tablet 1    amLODIPine (NORVASC) 10 MG tablet Take 0.5-1 tablets by mouth daily 90 tablet 1    nebivolol (BYSTOLIC) 10 MG tablet Take 1 tablet by mouth daily 90 tablet 1    losartan (COZAAR) 50 MG tablet Take 1 tablet by mouth daily 90 tablet 1    famotidine (PEPCID) 40 MG tablet Take 1 tablet by mouth daily 90 tablet 1    traZODone (DESYREL) 100 MG tablet Take 1 tablet by mouth nightly 90 tablet 1    fluticasone (FLONASE) 50 MCG/ACT nasal spray USE 1 SPRAY IN EACH NOSTRIL EVERY DAY. 32 g 1    loratadine (CLARITIN) 10 MG tablet Take 1 tablet by mouth daily 90 tablet

## 2024-12-09 ENCOUNTER — OFFICE VISIT (OUTPATIENT)
Dept: ORTHOPEDIC SURGERY | Age: 79
End: 2024-12-09
Payer: MEDICARE

## 2024-12-09 VITALS — HEIGHT: 67 IN | WEIGHT: 155 LBS | BODY MASS INDEX: 24.33 KG/M2

## 2024-12-09 DIAGNOSIS — M47.816 LUMBAR SPONDYLOSIS: Primary | ICD-10-CM

## 2024-12-09 PROCEDURE — 99214 OFFICE O/P EST MOD 30 MIN: CPT | Performed by: PHYSICIAN ASSISTANT

## 2024-12-09 PROCEDURE — 1123F ACP DISCUSS/DSCN MKR DOCD: CPT | Performed by: PHYSICIAN ASSISTANT

## 2024-12-09 RX ORDER — LORAZEPAM 1 MG/1
TABLET ORAL
Qty: 1 TABLET | Refills: 0 | Status: SHIPPED | OUTPATIENT
Start: 2024-12-09 | End: 2024-12-20

## 2024-12-09 NOTE — PROGRESS NOTES
12/09/24        Name: Alana Gutiérrez  YOB: 1945  Gender: female  MRN: [id number]    CC: Follow-up (Pt is here today for lumbar spine pain that has improved some with PT but she is still having some issues. )       HPI: Alana Gutiérrez is a 79 y.o. female who returns for Follow-up (Pt is here today for lumbar spine pain that has improved some with PT but she is still having some issues. )         Last saw the patient the office 6/27/2024.  July to August of 2024 she went through 16 sessions of physical therapy, she had no meaningful improvement with the therapy for her back and leg pain.  Also since I last saw her in the office she unexpectedly lost her son who passed away due to an undiagnosed heart condition.  She is in the middle of dealing with probate court for his loss, this is a very emotionally and physically stressful time for her.  She continues to have back pain rating to the legs left greater than right.  She unfortunately could not tolerate the gabapentin as it made her feel foggy and felt like she had cognitive dysfunction.  She has been taking Tylenol instead.    History was obtained by patient      Meds/PSH/PMH/FH/SH: This information has been reviewed.      ALLERGIES:   Allergies   Allergen Reactions    Latex Itching and Rash    Fluoxetine Diarrhea    Phenazopyridine Itching    Sulfa Antibiotics Itching     Nervous and shakey    Fenofibrate Nausea And Vomiting              Physical Examination:            Imaging:         MRI Result (most recent):  No results found for this or any previous visit from the past 3650 days.            Assessment and Plan    The patient has failed conservative management of her symptoms.  I expressed my sincere empathy and concern for her loss of her son so unexpectedly.  I recommend a lumbar MRI for further evaluation.  I will see the patient back in the office after the MRI to review the results.

## 2024-12-19 ENCOUNTER — OFFICE VISIT (OUTPATIENT)
Dept: ORTHOPEDIC SURGERY | Age: 79
End: 2024-12-19
Payer: MEDICARE

## 2024-12-19 VITALS — HEIGHT: 67 IN | WEIGHT: 155 LBS | BODY MASS INDEX: 24.33 KG/M2

## 2024-12-19 DIAGNOSIS — M48.062 SPINAL STENOSIS OF LUMBAR REGION WITH NEUROGENIC CLAUDICATION: ICD-10-CM

## 2024-12-19 DIAGNOSIS — M47.816 LUMBAR SPONDYLOSIS: Primary | ICD-10-CM

## 2024-12-19 PROCEDURE — 1123F ACP DISCUSS/DSCN MKR DOCD: CPT | Performed by: PHYSICIAN ASSISTANT

## 2024-12-19 PROCEDURE — 99214 OFFICE O/P EST MOD 30 MIN: CPT | Performed by: PHYSICIAN ASSISTANT

## 2024-12-19 NOTE — PROGRESS NOTES
above.    3. Please see the first section of the report for discussion of variant  vertebral body segmentation and utilized vertebral body numbering scheme.      Electronically signed by JIAN MELGAR            Assessment and Plan    I reviewed the MRI findings with the patient.  We discussed options for management, she is in favor of lumbar injection therapy.  I am concerned the L3-4 spinal canal may be difficult to access due to the degree of stenosis.  For this reason I recommended bilateral L3 selective nerve root blocks with Dr. Mack.  Plan to follow-up in 6 months for recheck of her symptoms or sooner as needed.  She will continue her directed home exercise program daily going forward.  We may consider referring her to outpatient physical therapy in the future if her symptoms begin to improve and progress where she can tolerate it better.

## 2025-01-07 ENCOUNTER — OFFICE VISIT (OUTPATIENT)
Dept: ORTHOPEDIC SURGERY | Age: 80
End: 2025-01-07
Payer: MEDICARE

## 2025-01-07 DIAGNOSIS — M43.16 SPONDYLOLISTHESIS OF LUMBAR REGION: ICD-10-CM

## 2025-01-07 DIAGNOSIS — M48.062 SPINAL STENOSIS OF LUMBAR REGION WITH NEUROGENIC CLAUDICATION: ICD-10-CM

## 2025-01-07 DIAGNOSIS — M54.16 LUMBAR RADICULOPATHY: ICD-10-CM

## 2025-01-07 DIAGNOSIS — M47.816 LUMBAR SPONDYLOSIS: Primary | ICD-10-CM

## 2025-01-07 PROCEDURE — 64483 NJX AA&/STRD TFRM EPI L/S 1: CPT | Performed by: PHYSICAL MEDICINE & REHABILITATION

## 2025-01-07 RX ORDER — TRIAMCINOLONE ACETONIDE 40 MG/ML
40 INJECTION, SUSPENSION INTRA-ARTICULAR; INTRAMUSCULAR ONCE
Status: COMPLETED | OUTPATIENT
Start: 2025-01-07 | End: 2025-01-07

## 2025-01-07 RX ADMIN — TRIAMCINOLONE ACETONIDE 40 MG: 40 INJECTION, SUSPENSION INTRA-ARTICULAR; INTRAMUSCULAR at 15:52

## 2025-01-13 ENCOUNTER — TELEPHONE (OUTPATIENT)
Dept: ORTHOPEDIC SURGERY | Age: 80
End: 2025-01-13

## 2025-01-13 NOTE — TELEPHONE ENCOUNTER
Patient called to let you know that she is still having some pain after her inj but it is better than before.

## 2025-01-13 NOTE — TELEPHONE ENCOUNTER
Called and spoke with pt. Pt states she was calling to let Dr. Mack know she was doing better and her pain is improving after her injection. Informed pt this will be sent to Dr. Mack. Pt voiced understanding.

## 2025-01-21 ENCOUNTER — TELEPHONE (OUTPATIENT)
Dept: ORTHOPEDIC SURGERY | Age: 80
End: 2025-01-21

## 2025-01-21 NOTE — TELEPHONE ENCOUNTER
Patient called to let  know that the injection helped but she still has some pain. She would like to talk to someone about this.

## 2025-02-11 ENCOUNTER — OFFICE VISIT (OUTPATIENT)
Dept: ORTHOPEDIC SURGERY | Age: 80
End: 2025-02-11
Payer: MEDICARE

## 2025-02-11 DIAGNOSIS — M47.816 LUMBAR SPONDYLOSIS: Primary | ICD-10-CM

## 2025-02-11 DIAGNOSIS — M43.16 SPONDYLOLISTHESIS OF LUMBAR REGION: ICD-10-CM

## 2025-02-11 DIAGNOSIS — M48.062 SPINAL STENOSIS OF LUMBAR REGION WITH NEUROGENIC CLAUDICATION: ICD-10-CM

## 2025-02-11 DIAGNOSIS — M54.16 LUMBAR RADICULOPATHY: ICD-10-CM

## 2025-02-11 PROCEDURE — 62323 NJX INTERLAMINAR LMBR/SAC: CPT | Performed by: PHYSICAL MEDICINE & REHABILITATION

## 2025-02-11 RX ORDER — BETAMETHASONE SODIUM PHOSPHATE AND BETAMETHASONE ACETATE 3; 3 MG/ML; MG/ML
12 INJECTION, SUSPENSION INTRA-ARTICULAR; INTRALESIONAL; INTRAMUSCULAR; SOFT TISSUE ONCE
Status: COMPLETED | OUTPATIENT
Start: 2025-02-11 | End: 2025-02-11

## 2025-02-11 RX ADMIN — BETAMETHASONE SODIUM PHOSPHATE AND BETAMETHASONE ACETATE 12 MG: 3; 3 INJECTION, SUSPENSION INTRA-ARTICULAR; INTRALESIONAL; INTRAMUSCULAR; SOFT TISSUE at 14:53

## 2025-02-11 NOTE — PROGRESS NOTES
Name: Alana Gutiérrez  YOB: 1945  Gender: female  MRN: [id number]        Interlaminar SPRING Procedure Note      Procedure: L4-L5 interlaminar epidural steroid injection    Precautions: Alana Gutiérrez denies prior sensitivity to steroid, local anesthetic, iodine, or shellfish.       Consent:  Consent was obtained prior to the procedure. The procedure was discussed at length with Alana Gutiérrez. She was given the opportunity to ask questions regarding the procedure and its associated risks.  In addition to the potential risks associated with the procedure itself, the patient was informed both verbally and in writing of potential side effects of the use glucocorticoids.  The patient appeared to comprehend the informed consent and desired to have the procedure performed, and informed consent was signed.     She was placed in a prone position on the fluoroscopy table and the skin was prepped and draped in a routine sterile fashion. The areas to be injected were each anesthetized with 1 ml of 1% Lidocaine. A 22 gauge 3.5 inch spinal needle was carefully advanced under fluoroscopic guidance to the L4-L5 interlaminar space. 0.5 ml of 70% of Omnipaque was injected to confirm proper needle placement and absence of subdural or vascular flow Once proper placement was confirmed, 2ml of sterile water and 12 mg of betamethasone were injected through the spinal needle.       Fluoroscopic guidance was used intermittently over a 10-minute period to insure proper needle placement and her safety. A hard copy of the fluoroscopic image has been placed in her chart and is saved on the C-arm hard drive. She was monitored for 30 minutes after the procedure and discharged home in a stable fashion with a routine follow up.    Procedural Diagnosis:     ICD-10-CM    1. Lumbar spondylosis  M47.816 XR INJ SPINE THER SUBST LUM/SAC W IMG     betamethasone acetate-betamethasone sodium phosphate (CELESTONE) injection 12 mg

## 2025-02-13 RX ORDER — TIZANIDINE 2 MG/1
2 TABLET ORAL 3 TIMES DAILY PRN
Qty: 45 TABLET | Refills: 3 | Status: SHIPPED | OUTPATIENT
Start: 2025-02-13

## 2025-02-13 NOTE — TELEPHONE ENCOUNTER
Requested Prescriptions     Pending Prescriptions Disp Refills    tiZANidine (ZANAFLEX) 2 MG tablet 45 tablet 3     Sig: Take 1 tablet by mouth 3 times daily as needed (Muscle spsams)

## 2025-03-25 ENCOUNTER — TELEPHONE (OUTPATIENT)
Dept: ORTHOPEDIC SURGERY | Age: 80
End: 2025-03-25

## 2025-03-25 DIAGNOSIS — M48.062 SPINAL STENOSIS OF LUMBAR REGION WITH NEUROGENIC CLAUDICATION: ICD-10-CM

## 2025-03-25 DIAGNOSIS — M54.16 LUMBAR RADICULOPATHY: ICD-10-CM

## 2025-03-25 DIAGNOSIS — M47.816 LUMBAR SPONDYLOSIS: Primary | ICD-10-CM

## 2025-03-28 NOTE — TELEPHONE ENCOUNTER
Called and spoke with pt. Pt was given difference options per Thomas. Pt would like to try another injection at this time. Pt was scheduled for injection on 04/10/25 @ 930 with [facility] at [facility]. Pt voiced understanding.         Referral Details       Referred By   Referred To    Thomas Allen PA   [address] [place] [address]   Phone: [phone]   Fax: [phone]    Diagnoses: Lumbar spondylosis   Lumbar radiculopathy   Spinal stenosis of lumbar region with neurogenic claudication   Order: Ambulatory Referral To Spine Injection    [facility] - [place] [address] [address] [facility] [address]   Phone: [phone]   Fax: [phone]

## 2025-04-02 ENCOUNTER — TELEPHONE (OUTPATIENT)
Age: 80
End: 2025-04-02

## 2025-04-02 NOTE — TELEPHONE ENCOUNTER
tr    Name: Alana Gutiérrez  YOB: 1945  Gender: female  MRN: [id number]        The most recent injection provided 50% pain reduction and improvement in functioning for at least 2 months.    The patient's current pain scale is 9/10.    As a result of the current recurrence of pain, the patient is having the following difficulties:  Difficulties with bathing and/or dressing  Difficulty sleeping at night  Difficulty transferring into or out of a car  Difficulty performing housework  Difficulty with sitting or performing work related activities    The patient has severe pain limiting function despite on-going, conservative, physician-guided treatment.    The patient is currently regularly engaging in physician or PT directed lumbar spine exercies.      MAXIMILIANO Garcia

## 2025-04-16 ENCOUNTER — OFFICE VISIT (OUTPATIENT)
Dept: ORTHOPEDIC SURGERY | Age: 80
End: 2025-04-16
Payer: MEDICARE

## 2025-04-16 DIAGNOSIS — M43.16 SPONDYLOLISTHESIS OF LUMBAR REGION: ICD-10-CM

## 2025-04-16 DIAGNOSIS — M47.816 LUMBAR SPONDYLOSIS: Primary | ICD-10-CM

## 2025-04-16 DIAGNOSIS — M54.16 LUMBAR RADICULOPATHY: ICD-10-CM

## 2025-04-16 DIAGNOSIS — M48.062 SPINAL STENOSIS OF LUMBAR REGION WITH NEUROGENIC CLAUDICATION: ICD-10-CM

## 2025-04-16 PROCEDURE — 62323 NJX INTERLAMINAR LMBR/SAC: CPT | Performed by: PHYSICAL MEDICINE & REHABILITATION

## 2025-04-16 RX ORDER — BETAMETHASONE SODIUM PHOSPHATE AND BETAMETHASONE ACETATE 3; 3 MG/ML; MG/ML
12 INJECTION, SUSPENSION INTRA-ARTICULAR; INTRALESIONAL; INTRAMUSCULAR; SOFT TISSUE ONCE
Status: COMPLETED | OUTPATIENT
Start: 2025-04-16 | End: 2025-04-16

## 2025-04-16 RX ADMIN — BETAMETHASONE SODIUM PHOSPHATE AND BETAMETHASONE ACETATE 12 MG: 3; 3 INJECTION, SUSPENSION INTRA-ARTICULAR; INTRALESIONAL; INTRAMUSCULAR; SOFT TISSUE at 15:00

## 2025-04-16 NOTE — PROGRESS NOTES
Name: Alana Gutiérrez  YOB: 1945  Gender: female  MRN: [id number]        Interlaminar SPRING Procedure Note      Procedure: L3-L4 interlaminar epidural steroid injection    Precautions: Alana Gutiérrez denies prior sensitivity to steroid, local anesthetic, iodine, or shellfish.     Ms. Gutiérrez is interested in ablation of the lumbar facet joints if today's injection does not provide significant, sustained pain relief.  Her neighbor had that procedure with very good relief.  We discussed that procedure today, and she was given written information about the procedure as well.  We discussed that if she decides to pursue that option, the next step would likely be medial branch nerve blocks X 2 for diagnostic purposes.    Consent:  Consent was obtained prior to the procedure. The procedure was discussed at length with Alana Gutiérrez. She was given the opportunity to ask questions regarding the procedure and its associated risks.  In addition to the potential risks associated with the procedure itself, the patient was informed both verbally and in writing of potential side effects of the use glucocorticoids.  The patient appeared to comprehend the informed consent and desired to have the procedure performed, and informed consent was signed.     She was placed in a prone position on the fluoroscopy table and the skin was prepped and draped in a routine sterile fashion. The areas to be injected were each anesthetized with 1 ml of 1% Lidocaine. A 22 gauge 3.5 inch spinal needle was carefully advanced under fluoroscopic guidance to the L3-L4 interlaminar space. 0.5 ml of 70% of Omnipaque was injected to confirm proper needle placement and absence of subdural or vascular flow Once proper placement was confirmed, 2ml of sterile water and 12 mg of betamethasone were injected through the spinal needle.       Fluoroscopic guidance was used intermittently over a 10-minute period to insure proper needle placement

## 2025-06-03 ENCOUNTER — OFFICE VISIT (OUTPATIENT)
Dept: FAMILY MEDICINE CLINIC | Facility: CLINIC | Age: 80
End: 2025-06-03
Payer: MEDICARE

## 2025-06-03 VITALS
OXYGEN SATURATION: 96 % | HEART RATE: 70 BPM | HEIGHT: 67 IN | BODY MASS INDEX: 24.86 KG/M2 | SYSTOLIC BLOOD PRESSURE: 132 MMHG | DIASTOLIC BLOOD PRESSURE: 78 MMHG | WEIGHT: 158.4 LBS

## 2025-06-03 DIAGNOSIS — E78.2 MIXED HYPERLIPIDEMIA: ICD-10-CM

## 2025-06-03 DIAGNOSIS — J30.89 ENVIRONMENTAL AND SEASONAL ALLERGIES: ICD-10-CM

## 2025-06-03 DIAGNOSIS — I10 HTN (HYPERTENSION), BENIGN: ICD-10-CM

## 2025-06-03 DIAGNOSIS — K21.9 GASTROESOPHAGEAL REFLUX DISEASE WITHOUT ESOPHAGITIS: ICD-10-CM

## 2025-06-03 DIAGNOSIS — F41.9 ANXIETY: Primary | ICD-10-CM

## 2025-06-03 DIAGNOSIS — F51.01 PRIMARY INSOMNIA: ICD-10-CM

## 2025-06-03 DIAGNOSIS — R82.998 LEUKOCYTES IN URINE: ICD-10-CM

## 2025-06-03 DIAGNOSIS — R30.0 DYSURIA: ICD-10-CM

## 2025-06-03 LAB
BILIRUBIN, URINE, POC: NEGATIVE
BLOOD URINE, POC: NEGATIVE
GLUCOSE URINE, POC: NEGATIVE
KETONES, URINE, POC: NEGATIVE
LEUKOCYTE ESTERASE, URINE, POC: ABNORMAL
NITRITE, URINE, POC: NEGATIVE
PH, URINE, POC: 6.5 (ref 4.6–8)
PROTEIN,URINE, POC: NEGATIVE
SPECIFIC GRAVITY, URINE, POC: 1.02 (ref 1–1.03)
URINALYSIS CLARITY, POC: ABNORMAL
URINALYSIS COLOR, POC: ABNORMAL
UROBILINOGEN, POC: ABNORMAL

## 2025-06-03 PROCEDURE — 81003 URINALYSIS AUTO W/O SCOPE: CPT | Performed by: FAMILY MEDICINE

## 2025-06-03 PROCEDURE — 1123F ACP DISCUSS/DSCN MKR DOCD: CPT | Performed by: FAMILY MEDICINE

## 2025-06-03 PROCEDURE — 3078F DIAST BP <80 MM HG: CPT | Performed by: FAMILY MEDICINE

## 2025-06-03 PROCEDURE — 99214 OFFICE O/P EST MOD 30 MIN: CPT | Performed by: FAMILY MEDICINE

## 2025-06-03 PROCEDURE — 1159F MED LIST DOCD IN RCRD: CPT | Performed by: FAMILY MEDICINE

## 2025-06-03 PROCEDURE — 3075F SYST BP GE 130 - 139MM HG: CPT | Performed by: FAMILY MEDICINE

## 2025-06-03 RX ORDER — FAMOTIDINE 40 MG/1
40 TABLET, FILM COATED ORAL DAILY
Qty: 90 TABLET | Refills: 1 | Status: SHIPPED | OUTPATIENT
Start: 2025-06-03 | End: 2025-11-30

## 2025-06-03 RX ORDER — AMLODIPINE BESYLATE 10 MG/1
TABLET ORAL
Qty: 30 TABLET | Refills: 2 | Status: SHIPPED | OUTPATIENT
Start: 2025-06-03

## 2025-06-03 RX ORDER — ROSUVASTATIN CALCIUM 40 MG/1
40 TABLET, COATED ORAL DAILY
Qty: 90 TABLET | Refills: 1 | Status: CANCELLED | OUTPATIENT
Start: 2025-06-03 | End: 2025-11-30

## 2025-06-03 RX ORDER — NEBIVOLOL 10 MG/1
10 TABLET ORAL DAILY
Qty: 90 TABLET | Refills: 1 | Status: SHIPPED | OUTPATIENT
Start: 2025-06-03 | End: 2025-11-30

## 2025-06-03 RX ORDER — LOSARTAN POTASSIUM 50 MG/1
50 TABLET ORAL DAILY
Qty: 90 TABLET | Refills: 1 | Status: SHIPPED | OUTPATIENT
Start: 2025-06-03 | End: 2025-11-30

## 2025-06-03 RX ORDER — LORATADINE 10 MG/1
10 TABLET ORAL DAILY
Qty: 90 TABLET | Refills: 1 | Status: SHIPPED | OUTPATIENT
Start: 2025-06-03 | End: 2025-11-30

## 2025-06-03 RX ORDER — SIMVASTATIN 10 MG
10 TABLET ORAL NIGHTLY
Qty: 90 TABLET | Refills: 1 | Status: SHIPPED | OUTPATIENT
Start: 2025-06-03

## 2025-06-03 RX ORDER — CLONAZEPAM 0.5 MG/1
0.5 TABLET ORAL NIGHTLY PRN
Qty: 30 TABLET | Refills: 2 | Status: SHIPPED | OUTPATIENT
Start: 2025-06-03 | End: 2025-09-01

## 2025-06-03 RX ORDER — TRAZODONE HYDROCHLORIDE 100 MG/1
100 TABLET ORAL NIGHTLY
Qty: 90 TABLET | Refills: 1 | Status: SHIPPED | OUTPATIENT
Start: 2025-06-03 | End: 2025-11-30

## 2025-06-03 RX ORDER — HYDROCHLOROTHIAZIDE 25 MG/1
25 TABLET ORAL DAILY
Qty: 90 TABLET | Refills: 1 | Status: SHIPPED | OUTPATIENT
Start: 2025-06-03 | End: 2025-11-30

## 2025-06-03 RX ORDER — DICYCLOMINE HCL 20 MG
20 TABLET ORAL 2 TIMES DAILY
Qty: 60 TABLET | Refills: 2 | Status: SHIPPED | OUTPATIENT
Start: 2025-06-03

## 2025-06-03 SDOH — ECONOMIC STABILITY: FOOD INSECURITY: WITHIN THE PAST 12 MONTHS, YOU WORRIED THAT YOUR FOOD WOULD RUN OUT BEFORE YOU GOT MONEY TO BUY MORE.: NEVER TRUE

## 2025-06-03 SDOH — ECONOMIC STABILITY: FOOD INSECURITY: WITHIN THE PAST 12 MONTHS, THE FOOD YOU BOUGHT JUST DIDN'T LAST AND YOU DIDN'T HAVE MONEY TO GET MORE.: NEVER TRUE

## 2025-06-03 ASSESSMENT — PATIENT HEALTH QUESTIONNAIRE - PHQ9
2. FEELING DOWN, DEPRESSED OR HOPELESS: MORE THAN HALF THE DAYS
SUM OF ALL RESPONSES TO PHQ QUESTIONS 1-9: 2
1. LITTLE INTEREST OR PLEASURE IN DOING THINGS: NOT AT ALL

## 2025-06-03 ASSESSMENT — ENCOUNTER SYMPTOMS
VOMITING: 0
CONSTIPATION: 0
COUGH: 0
SHORTNESS OF BREATH: 0
ABDOMINAL PAIN: 0
NAUSEA: 0
DIARRHEA: 0

## 2025-06-05 LAB
BACTERIA SPEC CULT: NORMAL
SERVICE CMNT-IMP: NORMAL

## 2025-06-12 ENCOUNTER — RESULTS FOLLOW-UP (OUTPATIENT)
Dept: FAMILY MEDICINE CLINIC | Facility: CLINIC | Age: 80
End: 2025-06-12

## 2025-06-19 ENCOUNTER — OFFICE VISIT (OUTPATIENT)
Dept: ORTHOPEDIC SURGERY | Age: 80
End: 2025-06-19
Payer: MEDICARE

## 2025-06-19 VITALS — HEIGHT: 67 IN | BODY MASS INDEX: 24.8 KG/M2 | WEIGHT: 158 LBS

## 2025-06-19 DIAGNOSIS — M25.561 RIGHT KNEE PAIN, UNSPECIFIED CHRONICITY: Primary | ICD-10-CM

## 2025-06-19 DIAGNOSIS — M47.816 LUMBAR SPONDYLOSIS: ICD-10-CM

## 2025-06-19 DIAGNOSIS — M54.16 LUMBAR RADICULOPATHY: ICD-10-CM

## 2025-06-19 DIAGNOSIS — M48.062 SPINAL STENOSIS OF LUMBAR REGION WITH NEUROGENIC CLAUDICATION: ICD-10-CM

## 2025-06-19 PROCEDURE — 1123F ACP DISCUSS/DSCN MKR DOCD: CPT | Performed by: PHYSICIAN ASSISTANT

## 2025-06-19 PROCEDURE — 99214 OFFICE O/P EST MOD 30 MIN: CPT | Performed by: PHYSICIAN ASSISTANT

## 2025-06-19 RX ORDER — PREGABALIN 50 MG/1
50 CAPSULE ORAL 3 TIMES DAILY
Qty: 90 CAPSULE | Refills: 1 | Status: SHIPPED | OUTPATIENT
Start: 2025-06-19 | End: 2025-08-18

## 2025-06-19 NOTE — PROGRESS NOTES
06/19/25        Name: Alana Gutiérrez  YOB: 1945  Gender: female  MRN: [id number]    CC: No chief complaint on file.       HPI: Alana Gutiérrez is a 79 y.o. female who returns for No chief complaint on file.       History of Present Illness  The patient presents for follow-up after recently undergoing an L3-4 interlaminar epidural steroid injection with Dr. Mack on 04/16/2025.    She reports that the injections provided minimal relief, with the first injection being slightly more effective than the subsequent ones. The relief lasted only for a day or so. She expresses a desire to increase her muscle relaxant dosage from 2 mg to 3 mg, as she believes the current dosage is insufficient. She also mentions a history of gastrointestinal sensitivity and prefers to take her medication with food.    She is experiencing intermittent pain in her right knee, accompanied by popping and cracking sounds. She is uncertain if the issue is related to her meniscus and seeks further evaluation for her knee pain.          Meds/PSH/PMH/FH/SH: This information has been reviewed.      ALLERGIES:   Allergies   Allergen Reactions    Latex Itching and Rash    Fluoxetine Diarrhea    Phenazopyridine Itching    Sulfa Antibiotics Itching     Nervous and shakey    Fenofibrate Nausea And Vomiting              Physical Examination:            Imaging:         MRI Result (most recent):  MRI LUMBAR SPINE WO CONTRAST 12/16/2024    Narrative  EXAMINATION: MRI LUMBAR SPINE 12/16/2024 1:51 PM    ACCESSION NUMBER: [id number]    INDICATION: Spondylosis without myelopathy or radiculopathy, lumbar region    COMPARISON: Lumbar spine x-rays 6/27/2024, CT urogram 8/31/2011    TECHNIQUE: Multisequence, multiplanar MR images were obtained of the lumbar  spine without the administration of intravenous contrast.    FINDINGS: For the purposes of this examination, there will be 4 lumbar-type  vertebral bodies with vertebral body numbering performed

## 2025-06-26 ENCOUNTER — OFFICE VISIT (OUTPATIENT)
Dept: ORTHOPEDIC SURGERY | Age: 80
End: 2025-06-26

## 2025-06-26 DIAGNOSIS — M17.11 OSTEOARTHRITIS OF RIGHT KNEE, UNSPECIFIED OSTEOARTHRITIS TYPE: ICD-10-CM

## 2025-06-26 DIAGNOSIS — M25.561 RIGHT KNEE PAIN, UNSPECIFIED CHRONICITY: Primary | ICD-10-CM

## 2025-06-26 RX ORDER — TRIAMCINOLONE ACETONIDE 40 MG/ML
40 INJECTION, SUSPENSION INTRA-ARTICULAR; INTRAMUSCULAR ONCE
Status: COMPLETED | OUTPATIENT
Start: 2025-06-26 | End: 2025-06-26

## 2025-06-26 RX ADMIN — TRIAMCINOLONE ACETONIDE 40 MG: 40 INJECTION, SUSPENSION INTRA-ARTICULAR; INTRAMUSCULAR at 14:29

## 2025-06-26 NOTE — PROGRESS NOTES
Name: Alana Gutiérrez  YOB: 1945  Gender: female  MRN: [id number]    CC:   Chief Complaint   Patient presents with    Knee Pain     NP right knee-getting xrays today         DIAGNOSIS:   Encounter Diagnoses   Name Primary?    Right knee pain, unspecified chronicity Yes    Osteoarthritis of right knee, unspecified osteoarthritis type         HPI:   The patient is a an 79-year-old female who presents today complaining of right knee pain that has been present for over 2 years and is becoming worse.  It hurts at night when sleeping.  The pain is located over the right knee.  It does hurt to walk and gets worse with increased distances.   The pain does not radiate down the leg.  Numbness and tingling are not noted.   Treatment so far has been NSAIDs.  She has lumbar spine DJD and has been managed with injections by Dr. Mack.  She also complains of chronic residual right ankle pain and stiffness after ORIF of distal fibula fracture and syndesmotic disruption.      Current Outpatient Medications:     pregabalin (LYRICA) 50 MG capsule, Take 1 capsule by mouth 3 times daily for 60 days. Max Daily Amount: 150 mg, Disp: 90 capsule, Rfl: 1    traZODone (DESYREL) 100 MG tablet, Take 1 tablet by mouth nightly, Disp: 90 tablet, Rfl: 1    nebivolol (BYSTOLIC) 10 MG tablet, Take 1 tablet by mouth daily, Disp: 90 tablet, Rfl: 1    losartan (COZAAR) 50 MG tablet, Take 1 tablet by mouth daily, Disp: 90 tablet, Rfl: 1    hydroCHLOROthiazide (HYDRODIURIL) 25 MG tablet, Take 1 tablet by mouth daily, Disp: 90 tablet, Rfl: 1    amLODIPine (NORVASC) 10 MG tablet, 1 tab daily prn for high BP., Disp: 30 tablet, Rfl: 2    famotidine (PEPCID) 40 MG tablet, Take 1 tablet by mouth daily, Disp: 90 tablet, Rfl: 1    dicyclomine (BENTYL) 20 MG tablet, Take 1 tablet by mouth 2 times daily, Disp: 60 tablet, Rfl: 2    loratadine (CLARITIN) 10 MG tablet, Take 1 tablet by mouth daily, Disp: 90 tablet, Rfl: 1    simvastatin (ZOCOR) 10 MG

## 2025-07-14 ENCOUNTER — HOSPITAL ENCOUNTER (OUTPATIENT)
Dept: PHYSICAL THERAPY | Age: 80
Setting detail: RECURRING SERIES
Discharge: HOME | End: 2025-07-17
Payer: MEDICARE

## 2025-07-14 DIAGNOSIS — R29.898 LEFT LEG WEAKNESS: ICD-10-CM

## 2025-07-14 DIAGNOSIS — R26.89 OTHER ABNORMALITIES OF GAIT AND MOBILITY: ICD-10-CM

## 2025-07-14 DIAGNOSIS — R26.81 UNSTEADINESS: ICD-10-CM

## 2025-07-14 DIAGNOSIS — M25.652 STIFFNESS OF LEFT HIP, NOT ELSEWHERE CLASSIFIED: ICD-10-CM

## 2025-07-14 DIAGNOSIS — M62.838 OTHER MUSCLE SPASM: ICD-10-CM

## 2025-07-14 DIAGNOSIS — R29.898 RIGHT LEG WEAKNESS: ICD-10-CM

## 2025-07-14 DIAGNOSIS — M54.59 OTHER LOW BACK PAIN: Primary | ICD-10-CM

## 2025-07-14 PROCEDURE — 97110 THERAPEUTIC EXERCISES: CPT

## 2025-07-14 PROCEDURE — 97162 PT EVAL MOD COMPLEX 30 MIN: CPT

## 2025-07-14 NOTE — THERAPY EVALUATION
Alana Gutiérrez  : 1945  Primary: [facility] (Medicare Managed)  Secondary:  [facility] @ [place] [address] [place] [place] [address]  Phone: [phone]  Fax: [phone] Plan Frequency: 1-3 sessions per week    Plan of Care/Certification Expiration Date: 10/14/25        Plan of Care/Certification Expiration Date:  Plan of Care/Certification Expiration Date: 10/14/25    Frequency/Duration: Plan Frequency: 1-3 sessions per week      Time In/Out:   Time In: 330  Time Out: 415      PT Visit Info:         Visit Count:  1                OUTPATIENT PHYSICAL THERAPY:             Initial Assessment 2025               Episode (lumbar rehabilitation)         Treatment Diagnosis:     Other low back pain  Other muscle spasm  Stiffness of left hip, not elsewhere classified  Left leg weakness  Right leg weakness  Unsteadiness  Other abnormalities of gait and mobility  Medical/Referring Diagnosis:    Spinal stenosis of lumbar region with neurogenic claudication  Lumbar radiculopathy  Lumbar spondylosis      Referring Physician:  Thomas Allen PA MD Orders:  PT Eval and Treat   Return MD Appt:    Date of Onset:  Onset Date: 07/15/22   (Approx 2-4 years ago noticed an increase in pain and disability)   Allergies:  Latex, Fluoxetine, Phenazopyridine, Sulfa antibiotics, and Fenofibrate  Restrictions/Precautions:    None      Medications Last Reviewed: 2025     SUBJECTIVE   History of Injury/Illness (Reason for Referral):  Pain in the left buttock and lower back is bothering her  Pain runs down the posterior leg  Feels like the right ankle surgery - following the surgery she had increased pain in the back and rest of the body  Pain:   Sitting 0/10 pain  Sit a lot because standing is painful  Standing and walking increased pain  8/10 bad day, 6-7/10  Wake up at night and has to adjust position  Occasionally can't get to sleep due to the pain  Pain in the right

## 2025-07-16 NOTE — PROGRESS NOTES
Alana Gutiérrez  : 1945  Primary: [facility] (Medicare Managed)  Secondary:  [facility] @ [place] [address] [address] [place] [place] [address]  Phone: [phone]  Fax: [phone] Plan Frequency: 1-3 sessions per week    Plan of Care/Certification Expiration Date: 10/14/25        Plan of Care/Certification Expiration Date:  Plan of Care/Certification Expiration Date: 10/14/25    Frequency/Duration: Plan Frequency: 1-3 sessions per week      Time In/Out:   Time In: 033  Time Out: 415      PT Visit Info:         Visit Count:  1    OUTPATIENT PHYSICAL THERAPY:   Treatment Note 2025       Episode  (lumbar rehabilitation)               Treatment Diagnosis:    Other low back pain  Other muscle spasm  Stiffness of left hip, not elsewhere classified  Left leg weakness  Right leg weakness  Unsteadiness  Other abnormalities of gait and mobility  Medical/Referring Diagnosis:    Spinal stenosis of lumbar region with neurogenic claudication  Lumbar radiculopathy  Lumbar spondylosis      Referring Physician:  Thomas Allen PA MD Orders:  PT Eval and Treat   Return MD Appt:     Date of Onset:  Onset Date: 07/15/22     Allergies:   Latex, Fluoxetine, Phenazopyridine, Sulfa antibiotics, and Fenofibrate  Restrictions/Precautions:   None      Interventions Planned (Treatment may consist of any combination of the following):     See Assessment Note    Subjective Comments:   See eval  Initial Pain Level:     0 /10  Post Session Pain Level:      0 /10  Medications Last Reviewed: 2025  Updated Objective Findings:  See Evaluation Note from today  Treatment   TREATMENT:   THERAPEUTIC ACTIVITY: ( see below for minutes): Therapeutic activities per grid below to improve mobility, strength, balance and coordination. Required minimal visual, verbal, manual and tactile cues to improve independence and safety with daily activities .  THERAPEUTIC EXERCISE: (see below for minutes): Exercises

## 2025-07-17 ENCOUNTER — APPOINTMENT (OUTPATIENT)
Dept: PHYSICAL THERAPY | Age: 80
End: 2025-07-17
Payer: MEDICARE

## 2025-07-21 ENCOUNTER — APPOINTMENT (OUTPATIENT)
Dept: PHYSICAL THERAPY | Age: 80
End: 2025-07-21
Payer: MEDICARE

## 2025-07-24 ENCOUNTER — APPOINTMENT (OUTPATIENT)
Dept: PHYSICAL THERAPY | Age: 80
End: 2025-07-24
Payer: MEDICARE

## 2025-07-29 ENCOUNTER — HOSPITAL ENCOUNTER (OUTPATIENT)
Dept: PHYSICAL THERAPY | Age: 80
Setting detail: RECURRING SERIES
Discharge: HOME | End: 2025-08-01
Payer: MEDICARE

## 2025-07-29 DIAGNOSIS — M17.11 OSTEOARTHRITIS OF RIGHT KNEE, UNSPECIFIED OSTEOARTHRITIS TYPE: Primary | ICD-10-CM

## 2025-07-29 PROCEDURE — 97110 THERAPEUTIC EXERCISES: CPT

## 2025-07-29 PROCEDURE — 97140 MANUAL THERAPY 1/> REGIONS: CPT

## 2025-07-29 NOTE — PROGRESS NOTES
Alana Gutiérrez  : 1945  Primary: [facility] (Medicare Managed)  Secondary:  [facility] @ [place] [address] [address] [place] [place] [address]  Phone: [phone]  Fax: [phone] Plan Frequency: 1-3 sessions per week    Plan of Care/Certification Expiration Date: 10/14/25        Plan of Care/Certification Expiration Date:  Plan of Care/Certification Expiration Date: 10/14/25    Frequency/Duration: Plan Frequency: 1-3 sessions per week      Time In/Out:   Time In: 0330  Time Out: 410      PT Visit Info:         Visit Count:  2    OUTPATIENT PHYSICAL THERAPY:   Treatment Note 2025       Episode  (lumbar rehabilitation)               Treatment Diagnosis:    Other low back pain  Other muscle spasm  Stiffness of left hip, not elsewhere classified  Left leg weakness  Right leg weakness  Unsteadiness  Other abnormalities of gait and mobility  Medical/Referring Diagnosis:    Spinal stenosis of lumbar region with neurogenic claudication  Lumbar radiculopathy  Lumbar spondylosis      Referring Physician:  Thomas Allen PA MD Orders:  PT Eval and Treat   Return MD Appt:     Date of Onset:  Onset Date: 07/15/22     Allergies:   Latex, Fluoxetine, Phenazopyridine, Sulfa antibiotics, and Fenofibrate  Restrictions/Precautions:   None      Interventions Planned (Treatment may consist of any combination of the following):     See Assessment Note    Subjective Comments:   Feeling sore today.   Initial Pain Level:     7 /10  Post Session Pain Level:      1 /10  Medications Last Reviewed: 2025  Updated Objective Findings:  See Evaluation Note from today  Treatment   TREATMENT:   THERAPEUTIC ACTIVITY: ( see below for minutes): Therapeutic activities per grid below to improve mobility, strength, balance and coordination. Required minimal visual, verbal, manual and tactile cues to improve independence and safety with daily activities .  THERAPEUTIC EXERCISE: (see below for

## 2025-08-01 ENCOUNTER — HOSPITAL ENCOUNTER (OUTPATIENT)
Dept: PHYSICAL THERAPY | Age: 80
Setting detail: RECURRING SERIES
Discharge: HOME | End: 2025-08-04
Payer: MEDICARE

## 2025-08-01 PROCEDURE — 97140 MANUAL THERAPY 1/> REGIONS: CPT

## 2025-08-01 PROCEDURE — 97110 THERAPEUTIC EXERCISES: CPT

## 2025-08-04 ENCOUNTER — HOSPITAL ENCOUNTER (OUTPATIENT)
Dept: PHYSICAL THERAPY | Age: 80
Setting detail: RECURRING SERIES
Discharge: HOME | End: 2025-08-07
Payer: MEDICARE

## 2025-08-04 ENCOUNTER — OFFICE VISIT (OUTPATIENT)
Dept: ORTHOPEDIC SURGERY | Age: 80
End: 2025-08-04
Payer: MEDICARE

## 2025-08-04 DIAGNOSIS — M17.11 OSTEOARTHRITIS OF RIGHT KNEE, UNSPECIFIED OSTEOARTHRITIS TYPE: Primary | ICD-10-CM

## 2025-08-04 PROCEDURE — 97140 MANUAL THERAPY 1/> REGIONS: CPT

## 2025-08-04 PROCEDURE — 97110 THERAPEUTIC EXERCISES: CPT

## 2025-08-04 PROCEDURE — 20611 DRAIN/INJ JOINT/BURSA W/US: CPT | Performed by: ORTHOPAEDIC SURGERY

## 2025-08-07 ENCOUNTER — HOSPITAL ENCOUNTER (OUTPATIENT)
Dept: PHYSICAL THERAPY | Age: 80
Setting detail: RECURRING SERIES
Discharge: HOME | End: 2025-08-10
Payer: MEDICARE

## 2025-08-07 PROCEDURE — 97110 THERAPEUTIC EXERCISES: CPT

## 2025-08-07 PROCEDURE — 97140 MANUAL THERAPY 1/> REGIONS: CPT

## 2025-08-11 ENCOUNTER — OFFICE VISIT (OUTPATIENT)
Dept: ORTHOPEDIC SURGERY | Age: 80
End: 2025-08-11
Payer: MEDICARE

## 2025-08-11 ENCOUNTER — HOSPITAL ENCOUNTER (OUTPATIENT)
Dept: PHYSICAL THERAPY | Age: 80
Setting detail: RECURRING SERIES
End: 2025-08-11
Payer: MEDICARE

## 2025-08-11 DIAGNOSIS — M17.11 OSTEOARTHRITIS OF RIGHT KNEE, UNSPECIFIED OSTEOARTHRITIS TYPE: Primary | ICD-10-CM

## 2025-08-11 PROCEDURE — 20611 DRAIN/INJ JOINT/BURSA W/US: CPT | Performed by: ORTHOPAEDIC SURGERY

## 2025-08-13 ENCOUNTER — HOSPITAL ENCOUNTER (OUTPATIENT)
Dept: PHYSICAL THERAPY | Age: 80
Setting detail: RECURRING SERIES
Discharge: HOME | End: 2025-08-16
Payer: MEDICARE

## 2025-08-13 PROCEDURE — 97110 THERAPEUTIC EXERCISES: CPT

## 2025-08-13 PROCEDURE — 97140 MANUAL THERAPY 1/> REGIONS: CPT

## 2025-08-20 ENCOUNTER — HOSPITAL ENCOUNTER (OUTPATIENT)
Dept: PHYSICAL THERAPY | Age: 80
Setting detail: RECURRING SERIES
Discharge: HOME | End: 2025-08-23
Payer: MEDICARE

## 2025-08-20 PROCEDURE — 97110 THERAPEUTIC EXERCISES: CPT

## 2025-08-20 PROCEDURE — 97140 MANUAL THERAPY 1/> REGIONS: CPT

## 2025-08-22 ENCOUNTER — HOSPITAL ENCOUNTER (OUTPATIENT)
Dept: PHYSICAL THERAPY | Age: 80
Setting detail: RECURRING SERIES
Discharge: HOME | End: 2025-08-25
Payer: MEDICARE

## 2025-08-22 PROCEDURE — 97140 MANUAL THERAPY 1/> REGIONS: CPT

## 2025-08-22 PROCEDURE — 97110 THERAPEUTIC EXERCISES: CPT

## 2025-08-25 ENCOUNTER — OFFICE VISIT (OUTPATIENT)
Dept: ORTHOPEDIC SURGERY | Age: 80
End: 2025-08-25
Payer: MEDICARE

## 2025-08-25 DIAGNOSIS — M17.11 OSTEOARTHRITIS OF RIGHT KNEE, UNSPECIFIED OSTEOARTHRITIS TYPE: Primary | ICD-10-CM

## 2025-08-25 PROCEDURE — 20611 DRAIN/INJ JOINT/BURSA W/US: CPT | Performed by: ORTHOPAEDIC SURGERY

## 2025-08-26 ENCOUNTER — HOSPITAL ENCOUNTER (OUTPATIENT)
Dept: PHYSICAL THERAPY | Age: 80
Setting detail: RECURRING SERIES
Discharge: HOME | End: 2025-08-29
Payer: MEDICARE

## 2025-08-26 PROCEDURE — 97110 THERAPEUTIC EXERCISES: CPT

## 2025-08-26 PROCEDURE — 97140 MANUAL THERAPY 1/> REGIONS: CPT

## 2025-08-29 ENCOUNTER — HOSPITAL ENCOUNTER (OUTPATIENT)
Dept: PHYSICAL THERAPY | Age: 80
Setting detail: RECURRING SERIES
Discharge: HOME | End: 2025-09-01
Payer: MEDICARE

## 2025-08-29 PROCEDURE — 97110 THERAPEUTIC EXERCISES: CPT

## 2025-08-29 PROCEDURE — 97140 MANUAL THERAPY 1/> REGIONS: CPT

## (undated) DEVICE — DRAPE SHT 3 QTR PROXIMA 53X77 --

## (undated) DEVICE — DRILL BIT

## (undated) DEVICE — PREP SKN CHLRAPRP APL 26ML STR --

## (undated) DEVICE — ZIMMER® STERILE DISPOSABLE TOURNIQUET CUFF WITH PLC, DUAL PORT, SINGLE BLADDER, 30 IN. (76 CM)

## (undated) DEVICE — SPLINT MAT XF SPEC 5X30IN --

## (undated) DEVICE — BUTTON SWITCH PENCIL BLADE ELECTRODE, HOLSTER: Brand: EDGE

## (undated) DEVICE — PAD,ABDOMINAL,5"X9",ST,LF,25/BX: Brand: MEDLINE INDUSTRIES, INC.

## (undated) DEVICE — IMPLANTABLE DEVICE
Type: IMPLANTABLE DEVICE | Site: ANKLE | Status: NON-FUNCTIONAL
Brand: ORTHOLOC
Removed: 2020-11-09

## (undated) DEVICE — DRAPE C ARM W54XL84IN MINI FOR OEC 6800

## (undated) DEVICE — PADDING CAST W2INXL4YD ST COT COHESIVE HND TEARABLE SPEC

## (undated) DEVICE — BNDG,ELSTC,MATRIX,STRL,6"X5YD,LF,HOOK&LP: Brand: MEDLINE

## (undated) DEVICE — AMD ANTIMICROBIAL GAUZE SPONGES,12 PLY USP TYPE VII, 0.2% POLYHEXAMETHYLENE BIGUANIDE HCI (PHMB): Brand: CURITY

## (undated) DEVICE — SOLUTION IV 1000ML 0.9% SOD CHL

## (undated) DEVICE — DRSG GZ OIL EMUL CURAD 3X8 --

## (undated) DEVICE — 3M™ COBAN™ NL STERILE NON-LATEX SELF-ADHERENT WRAP, 2082S, 2 IN X 5 YD (5 CM X 4,5 M), 36 ROLLS/CASE: Brand: 3M™ COBAN™

## (undated) DEVICE — BANDAGE,GAUZE,CONFORMING,2"X75",STRL,LF: Brand: MEDLINE INDUSTRIES, INC.

## (undated) DEVICE — PADDING CAST W4INXL4YD ST COT COHESIVE HND TEARABLE SPEC

## (undated) DEVICE — FOOT & ANKLE SOFT DR WOMACK: Brand: MEDLINE INDUSTRIES, INC.

## (undated) DEVICE — BANDAGE,ELASTIC,ESMARK,STERILE,4"X9',LF: Brand: MEDLINE

## (undated) DEVICE — SUTURE MCRYL SZ 3-0 L27IN ABSRB UD L19MM PS-2 3/8 CIR PRIM Y427H

## (undated) DEVICE — REM POLYHESIVE ADULT PATIENT RETURN ELECTRODE: Brand: VALLEYLAB

## (undated) DEVICE — BNDG,ELSTC,MATRIX,STRL,4"X5YD,LF,HOOK&LP: Brand: MEDLINE

## (undated) DEVICE — TEMP FIXATION PIN
Type: IMPLANTABLE DEVICE | Site: ANKLE | Status: NON-FUNCTIONAL
Removed: 2020-11-09

## (undated) DEVICE — DRAPE XR C ARM 41X74IN LF --